# Patient Record
Sex: MALE | Race: OTHER | NOT HISPANIC OR LATINO | URBAN - METROPOLITAN AREA
[De-identification: names, ages, dates, MRNs, and addresses within clinical notes are randomized per-mention and may not be internally consistent; named-entity substitution may affect disease eponyms.]

---

## 2018-10-24 ENCOUNTER — EMERGENCY (EMERGENCY)
Facility: HOSPITAL | Age: 38
LOS: 1 days | Discharge: LEFT BEFORE DISCHARGE | End: 2018-10-24
Admitting: EMERGENCY MEDICINE

## 2018-10-24 VITALS
TEMPERATURE: 98 F | DIASTOLIC BLOOD PRESSURE: 80 MMHG | SYSTOLIC BLOOD PRESSURE: 127 MMHG | HEART RATE: 77 BPM | RESPIRATION RATE: 18 BRPM | OXYGEN SATURATION: 98 %

## 2018-10-24 NOTE — ED ADULT TRIAGE NOTE - CHIEF COMPLAINT QUOTE
Pt c/o numbness/tingling to left leg that began on Monday. Pt now states he is having left leg pain that is constant. Denies injury/trauma. Pt ambulatory in triage. Denies PMH.

## 2018-10-25 ENCOUNTER — INPATIENT (INPATIENT)
Facility: HOSPITAL | Age: 38
LOS: 0 days | Discharge: HOME | DRG: 69 | End: 2018-10-26
Attending: PSYCHIATRY & NEUROLOGY | Admitting: PSYCHIATRY & NEUROLOGY
Payer: COMMERCIAL

## 2018-10-25 VITALS
RESPIRATION RATE: 16 BRPM | HEART RATE: 57 BPM | WEIGHT: 154.98 LBS | TEMPERATURE: 97 F | SYSTOLIC BLOOD PRESSURE: 135 MMHG | HEIGHT: 70 IN | OXYGEN SATURATION: 99 % | DIASTOLIC BLOOD PRESSURE: 86 MMHG

## 2018-10-25 DIAGNOSIS — R53.1 WEAKNESS: ICD-10-CM

## 2018-10-25 LAB
ALBUMIN SERPL ELPH-MCNC: 4.6 G/DL — SIGNIFICANT CHANGE UP (ref 3.3–5)
ALP SERPL-CCNC: 49 U/L — SIGNIFICANT CHANGE UP (ref 40–120)
ALT FLD-CCNC: 14 U/L — SIGNIFICANT CHANGE UP (ref 10–45)
ANION GAP SERPL CALC-SCNC: 11 MMOL/L — SIGNIFICANT CHANGE UP (ref 5–17)
APPEARANCE UR: CLEAR — SIGNIFICANT CHANGE UP
APTT BLD: 32.6 SEC — SIGNIFICANT CHANGE UP (ref 27.5–37.4)
AST SERPL-CCNC: 22 U/L — SIGNIFICANT CHANGE UP (ref 10–40)
BACTERIA # UR AUTO: NEGATIVE — SIGNIFICANT CHANGE UP
BASOPHILS # BLD AUTO: 0.1 K/UL — SIGNIFICANT CHANGE UP (ref 0–0.2)
BASOPHILS NFR BLD AUTO: 1.3 % — SIGNIFICANT CHANGE UP (ref 0–2)
BILIRUB SERPL-MCNC: 0.4 MG/DL — SIGNIFICANT CHANGE UP (ref 0.2–1.2)
BILIRUB UR-MCNC: NEGATIVE — SIGNIFICANT CHANGE UP
BUN SERPL-MCNC: 18 MG/DL — SIGNIFICANT CHANGE UP (ref 7–23)
CALCIUM SERPL-MCNC: 9.5 MG/DL — SIGNIFICANT CHANGE UP (ref 8.4–10.5)
CHLORIDE SERPL-SCNC: 104 MMOL/L — SIGNIFICANT CHANGE UP (ref 96–108)
CO2 SERPL-SCNC: 26 MMOL/L — SIGNIFICANT CHANGE UP (ref 22–31)
COLOR SPEC: COLORLESS — SIGNIFICANT CHANGE UP
CREAT SERPL-MCNC: 1.02 MG/DL — SIGNIFICANT CHANGE UP (ref 0.5–1.3)
CRP SERPL-MCNC: <0.1 MG/DL — SIGNIFICANT CHANGE UP (ref 0–0.4)
DIFF PNL FLD: NEGATIVE — SIGNIFICANT CHANGE UP
EOSINOPHIL # BLD AUTO: 0.4 K/UL — SIGNIFICANT CHANGE UP (ref 0–0.5)
EOSINOPHIL NFR BLD AUTO: 6 % — SIGNIFICANT CHANGE UP (ref 0–6)
EPI CELLS # UR: 0 /HPF — SIGNIFICANT CHANGE UP
ERYTHROCYTE [SEDIMENTATION RATE] IN BLOOD: 3 MM/HR — SIGNIFICANT CHANGE UP (ref 0–15)
GAS PNL BLDV: SIGNIFICANT CHANGE UP
GLUCOSE SERPL-MCNC: 89 MG/DL — SIGNIFICANT CHANGE UP (ref 70–99)
GLUCOSE UR QL: NEGATIVE — SIGNIFICANT CHANGE UP
HCT VFR BLD CALC: 42.3 % — SIGNIFICANT CHANGE UP (ref 39–50)
HGB BLD-MCNC: 13.6 G/DL — SIGNIFICANT CHANGE UP (ref 13–17)
HYALINE CASTS # UR AUTO: 0 /LPF — SIGNIFICANT CHANGE UP (ref 0–2)
INR BLD: 1.25 RATIO — HIGH (ref 0.88–1.16)
KETONES UR-MCNC: NEGATIVE — SIGNIFICANT CHANGE UP
LEUKOCYTE ESTERASE UR-ACNC: NEGATIVE — SIGNIFICANT CHANGE UP
LYMPHOCYTES # BLD AUTO: 3.1 K/UL — SIGNIFICANT CHANGE UP (ref 1–3.3)
LYMPHOCYTES # BLD AUTO: 46.1 % — HIGH (ref 13–44)
MCHC RBC-ENTMCNC: 30.4 PG — SIGNIFICANT CHANGE UP (ref 27–34)
MCHC RBC-ENTMCNC: 32.2 GM/DL — SIGNIFICANT CHANGE UP (ref 32–36)
MCV RBC AUTO: 94.2 FL — SIGNIFICANT CHANGE UP (ref 80–100)
MONOCYTES # BLD AUTO: 0.6 K/UL — SIGNIFICANT CHANGE UP (ref 0–0.9)
MONOCYTES NFR BLD AUTO: 9.3 % — SIGNIFICANT CHANGE UP (ref 2–14)
NEUTROPHILS # BLD AUTO: 2.5 K/UL — SIGNIFICANT CHANGE UP (ref 1.8–7.4)
NEUTROPHILS NFR BLD AUTO: 37.3 % — LOW (ref 43–77)
NITRITE UR-MCNC: NEGATIVE — SIGNIFICANT CHANGE UP
PH UR: 7 — SIGNIFICANT CHANGE UP (ref 5–8)
PLATELET # BLD AUTO: 242 K/UL — SIGNIFICANT CHANGE UP (ref 150–400)
POTASSIUM SERPL-MCNC: 3.9 MMOL/L — SIGNIFICANT CHANGE UP (ref 3.5–5.3)
POTASSIUM SERPL-SCNC: 3.9 MMOL/L — SIGNIFICANT CHANGE UP (ref 3.5–5.3)
PROT SERPL-MCNC: 6.9 G/DL — SIGNIFICANT CHANGE UP (ref 6–8.3)
PROT UR-MCNC: NEGATIVE — SIGNIFICANT CHANGE UP
PROTHROM AB SERPL-ACNC: 13.7 SEC — HIGH (ref 9.8–12.7)
RBC # BLD: 4.49 M/UL — SIGNIFICANT CHANGE UP (ref 4.2–5.8)
RBC # FLD: 12.6 % — SIGNIFICANT CHANGE UP (ref 10.3–14.5)
RBC CASTS # UR COMP ASSIST: 0 /HPF — SIGNIFICANT CHANGE UP (ref 0–4)
SODIUM SERPL-SCNC: 141 MMOL/L — SIGNIFICANT CHANGE UP (ref 135–145)
SP GR SPEC: 1.04 — HIGH (ref 1.01–1.02)
UROBILINOGEN FLD QL: NEGATIVE — SIGNIFICANT CHANGE UP
WBC # BLD: 6.7 K/UL — SIGNIFICANT CHANGE UP (ref 3.8–10.5)
WBC # FLD AUTO: 6.7 K/UL — SIGNIFICANT CHANGE UP (ref 3.8–10.5)
WBC UR QL: 0 /HPF — SIGNIFICANT CHANGE UP (ref 0–5)

## 2018-10-25 PROCEDURE — 70496 CT ANGIOGRAPHY HEAD: CPT | Mod: 26

## 2018-10-25 PROCEDURE — 99255 IP/OBS CONSLTJ NEW/EST HI 80: CPT

## 2018-10-25 PROCEDURE — 93010 ELECTROCARDIOGRAM REPORT: CPT

## 2018-10-25 PROCEDURE — 70544 MR ANGIOGRAPHY HEAD W/O DYE: CPT | Mod: 26,59

## 2018-10-25 PROCEDURE — 70498 CT ANGIOGRAPHY NECK: CPT | Mod: 26

## 2018-10-25 PROCEDURE — 72125 CT NECK SPINE W/O DYE: CPT | Mod: 26

## 2018-10-25 PROCEDURE — 99285 EMERGENCY DEPT VISIT HI MDM: CPT | Mod: 25

## 2018-10-25 PROCEDURE — 70450 CT HEAD/BRAIN W/O DYE: CPT | Mod: 26,59

## 2018-10-25 PROCEDURE — 70547 MR ANGIOGRAPHY NECK W/O DYE: CPT | Mod: 26

## 2018-10-25 PROCEDURE — 93971 EXTREMITY STUDY: CPT | Mod: 26

## 2018-10-25 PROCEDURE — 70553 MRI BRAIN STEM W/O & W/DYE: CPT | Mod: 26

## 2018-10-25 PROCEDURE — 72156 MRI NECK SPINE W/O & W/DYE: CPT | Mod: 26

## 2018-10-25 PROCEDURE — 71045 X-RAY EXAM CHEST 1 VIEW: CPT | Mod: 26

## 2018-10-25 RX ORDER — GABAPENTIN 400 MG/1
300 CAPSULE ORAL THREE TIMES A DAY
Qty: 0 | Refills: 0 | Status: DISCONTINUED | OUTPATIENT
Start: 2018-10-25 | End: 2018-10-26

## 2018-10-25 RX ORDER — TRAMADOL HYDROCHLORIDE 50 MG/1
50 TABLET ORAL ONCE
Qty: 0 | Refills: 0 | Status: DISCONTINUED | OUTPATIENT
Start: 2018-10-25 | End: 2018-10-25

## 2018-10-25 RX ORDER — ASPIRIN/CALCIUM CARB/MAGNESIUM 324 MG
81 TABLET ORAL DAILY
Qty: 0 | Refills: 0 | Status: DISCONTINUED | OUTPATIENT
Start: 2018-10-25 | End: 2018-10-26

## 2018-10-25 RX ORDER — GABAPENTIN 400 MG/1
300 CAPSULE ORAL ONCE
Qty: 0 | Refills: 0 | Status: COMPLETED | OUTPATIENT
Start: 2018-10-25 | End: 2018-10-25

## 2018-10-25 RX ORDER — ENOXAPARIN SODIUM 100 MG/ML
40 INJECTION SUBCUTANEOUS EVERY 24 HOURS
Qty: 0 | Refills: 0 | Status: DISCONTINUED | OUTPATIENT
Start: 2018-10-25 | End: 2018-10-26

## 2018-10-25 RX ADMIN — GABAPENTIN 300 MILLIGRAM(S): 400 CAPSULE ORAL at 20:00

## 2018-10-25 RX ADMIN — ENOXAPARIN SODIUM 40 MILLIGRAM(S): 100 INJECTION SUBCUTANEOUS at 21:36

## 2018-10-25 RX ADMIN — TRAMADOL HYDROCHLORIDE 50 MILLIGRAM(S): 50 TABLET ORAL at 03:58

## 2018-10-25 RX ADMIN — GABAPENTIN 300 MILLIGRAM(S): 400 CAPSULE ORAL at 09:03

## 2018-10-25 RX ADMIN — Medication 81 MILLIGRAM(S): at 21:36

## 2018-10-25 RX ADMIN — GABAPENTIN 300 MILLIGRAM(S): 400 CAPSULE ORAL at 14:57

## 2018-10-25 NOTE — ED PROVIDER NOTE - PHYSICAL EXAMINATION
PHYSICAL EXAM:   General: well-appearing man  who appears stated age, in no acute distress  HEENT: normocephalic, atraumatic, PERRLA, no conjunctival erythema,   Cardiovascular: normal rate and rhythm, + S1/S2  Respiratory: clear to auscultation bilaterally, good aeration bilaterally  Abdominal: soft, nontender, nondistended, no rebound, guarding or rigidity, no peritoneal signs +bowel sounds  Extremities: no LE edema or color changes. Left calf ttp. DP pulses equal and strong b/l.   Neuro: Alert and oriented x3. CN2-12 intact, Strength 5/5 in upper and lower extremities. Sensation intact to light touch in upper and lower extremities. +left foot spasticity. Full ROM of left hip, no pain with flexion, internal or external rotation. Gait WNL. Left patellar reflex diminished, difficult to discern if this was 2/2 cramping/spasticity  Psychiatric: appropriate mood and affect.   Skin: appropriate color, warm, dry and intact  -Amada Wright PGY-1 PHYSICAL EXAM:   General: well-appearing man  who appears stated age, in no acute distress  HEENT: normocephalic, atraumatic, PERRLA, no conjunctival erythema,   Cardiovascular: normal rate and rhythm, + S1/S2  Respiratory: clear to auscultation bilaterally, good aeration bilaterally  Abdominal: soft, nontender, nondistended, no rebound, guarding or rigidity, no peritoneal signs +bowel sounds  Extremities: no LE edema or color changes. Left calf ttp. DP pulses equal and strong b/l.   Neuro: Alert and oriented x3. CN2-12 intact, Strength 5/5 in right upper and lower extremities, 4/5 in left upper and left lower extremities. Sensation intact to light touch in right upper and lower extremities. Decreased to light touch grossly in left upper and lower extremities. +left foot spasticity. Full ROM of left hip, no pain with flexion, internal or external rotation. Gait WNL. Left patellar hyperreflexia (3+)  Psychiatric: appropriate mood and affect.   Skin: appropriate color, warm, dry and intact  -Amada Wright PGY-1

## 2018-10-25 NOTE — ED PROVIDER NOTE - PROGRESS NOTE DETAILS
Amada Wright MD PGY-1 Neuro paged [id number]) to [phone] 2 times (1:45AM, 235 AM), and once to [id number] (251AM), awaiting callback. Amada Wright MD PGY-1 Spoke with neuro 4:16AM. Will consult in ED.

## 2018-10-25 NOTE — ED ADULT NURSE NOTE - OBJECTIVE STATEMENT
39 y/o male A&Ox4, PMH high cholesterol, left sided weakness "from a stroke when a child" as pt states, presents to ED with c/o left calf pain x2 days. Pt states pain causes numbness and spasms in left foot, toes and lower leg. Pt also has c/o left sided chest pain radiating to left back and down left arm. Pt denies SOB/n/v. Pt endorses family history of heart attack (paternal death 2014 from MI) Upon further assessment, airway patent and intact, ABD soft nontender. Skin intact. Peripheral pulses strong x4. Safety and comfort measures maintained.

## 2018-10-25 NOTE — ED ADULT NURSE NOTE - NSIMPLEMENTINTERV_GEN_ALL_ED
Implemented All Universal Safety Interventions:  [place] to call system. Call bell, personal items and telephone within reach. Instruct patient to call for assistance. Room bathroom lighting operational. Non-slip footwear when patient is off stretcher. Physically safe environment: no spills, clutter or unnecessary equipment. Stretcher in lowest position, wheels locked, appropriate side rails in place.

## 2018-10-25 NOTE — ED PROVIDER NOTE - ATTENDING CONTRIBUTION TO CARE
38 yom pmhx prior stroke per pt at the age of [age], presents w numbness and tingling to left arm primarily from elbow down to hand, as well as pain to left leg/ thigh with associated left leg numbness/ tingling to left lower leg as well x 2-3 days. states intermittently the pain in his left arm involves his shoulder also. pt denies any signif neck pain or manipulation of neck. denies any sxs involving his face.   pt states has chronic muscle contractions/ plantar flexion/ abnormal movement/ spasm to his lower left leg and foot since childhood presumably as sequellae of his prior stroke. states does not follow w a neurologist. pt states does not know why he had a stroke at age of [age]. denies headache, no fever or chills, is ambulating well, no trauma or falls, no vision changes.     ROS:   constitutional - no fever, no chills  eyes - no visual changes, no redness  eent - no sore throat, no nasal congestion  cvs - no chest pain, no leg swelling  resp - no shortness of breath, no cough  gi - no abdominal pain, no vomiting, no diarrhea  gu - no dysuria, no hematuria  msk - no acute back pain, no joint swelling  skin - no rashes, no jaundice  neuro - no headache, mild focal weakness  psych - no acute mental health issue     Physical Exam:   constitutional - well appearing, awake and alert, oriented x3  head - no external evidence of trauma  cvs - rrr, no murmurs, no peripheral edema  resp - breath sounds clear and equal bilat  gi - abdomen soft and nontender, no rigidity, guarding or rebound, bowel sounds present  msk - moving all extremities spontaneously  neuro - alert and oriented x3, CNs 2-12 grossly intact. no midline cervical or paraspinous tenderness; full rom to neck, no meningismus. left arm: strength 4.5/5, grossly sensation appears to be intact, no pronator drift to either arm. right arm: strength 5/5. left leg: pt states chronic mild to mod intermittent spasticity primarily involving lower leg/ foot. strength 4.5/5, gait nonantalgic. right le/5.     pt w new appearing very mild left sided weakness and reported sensory deficit. pt states has crhonic spasticity of left foot though states perhaps mildly worse at this time.   ? recurrent small stroke vs vascular dissection/ stenosis vs transverse myelitis vs new MS - neuro consulted in ED and recommends admission for further eval of CT/ CTA findings above. DONA Jimenez MD   skin- no jaundice, warm and dry  psych - mood and affect wnl, no apparent risk to self or others

## 2018-10-25 NOTE — CONSULT NOTE ADULT - SUBJECTIVE AND OBJECTIVE BOX
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 Patient reports that he woke up with changes to sensation on Monday morning. Patient states that his left leg below the knee is experiencing burning paresthesia, above the knee numbness; his left arm is experiencing numbness with sharp pain in his antecubital fossa. Patient states that the sensation changes have been persistent, not improving with medication or movement. Patient denies any other neurological changes. Patient denies fevers, chills, ns, cp, sob, abd pain, n/v/d/c, weakness, or changes to his weight or senses.  He denies CP, SOB LOC.  CTA reveals marked narrowing of the ICA.  Neuro w/u in progress,       PAST MEDICAL & SURGICAL HISTORY:  Hyperlipidemia: diet controlled  No significant past surgical history          MEDICATIONS:  MEDICATIONS  (STANDING):  aspirin enteric coated 81 milliGRAM(s) Oral daily  enoxaparin Injectable 40 milliGRAM(s) SubCutaneous every 24 hours  gabapentin 300 milliGRAM(s) Oral three times a day      Allergies    No Known Allergies    Intolerances        FAMILY HISTORY:  Family history of coronary artery disease in father:  MI 59yo    Non-contributary for premature coronary disease or sudden cardiac death    SOCIAL HISTORY:    [ X] Non-smoker  [ ] Smoker  [ ] Alcohol      REVIEW OF SYSTEMS:  [ ]chest pain  [  ]shortness of breath  [  ]palpitations  [  ]syncope  [ ]near syncope [ ]upper extremity weakness   [ ] lower extremity weakness  [  ]diplopia  [  ]altered mental status   [  ]fevers  [ ]chills [ ]nausea  [ ]vomitting  [  ]dysphagia    [ ]abdominal pain  [ ]melena  [ ]BRBPR    [  ]epistaxis  [  ]rash    [ ]lower extremity edema        [X ] All others negative	  [ ] Unable to obtain      LABS:	 	    CARDIAC MARKERS:                              13.6   6.7   )-----------( 242      ( 25 Oct 2018 01:55 )             42.3     Hb Trend: 13.6<--    10-25    141  |  104  |  18  ----------------------------<  89  3.9   |  26  |  1.02    Ca    9.5      25 Oct 2018 01:55    TPro  6.9  /  Alb  4.6  /  TBili  0.4  /  DBili  x   /  AST  22  /  ALT  14  /  AlkPhos  49  10-25    Creatinine Trend: 1.02<--    Coags:  PT/INR - ( 25 Oct 2018 01:57 )   PT: 13.7 sec;   INR: 1.25 ratio         PTT - ( 25 Oct 2018 01:57 )  PTT:32.6 sec        PHYSICAL EXAM:  T(C): 36.4 (10-25-18 @ 16:01), Max: 36.7 (10-25-18 @ 01:05)  HR: 59 (10-25-18 @ 16:01) (57 - 78)  BP: 114/72 (10-25-18 @ 16:01) (113/73 - 135/86)  RR: 18 (10-25-18 @ 16:01) (12 - 18)  SpO2: 99% (10-25-18 @ 16:01) (97% - 100%)  Wt(kg): --  I&O's Summary      Gen: Appears well in NAD  HEENT:  (-)icterus (-)pallor  CV: N S1 S2 1/6 DAVID (+)2 Pulses B/l  Resp:  Clear to ausculatation B/L, normal effort  GI: (+) BS Soft, NT, ND  Lymph:  (-)Edema, (-)obvious lymphadenopathy  Skin: Warm to touch, Normal turgor  Psych: Appropriate mood and affect          ECG:  	Sinus     RADIOLOGY:         CXR:  No infiltrate     ASSESSMENT/PLAN: 	38y Male admitted with amor sensory loss found with narrowing of the ICA ? Black Black    - Plan for Cerebral angiogram  - No embolic CVA on MRI  - Neuro f/u  - Will follow with you    I once again thank you for allowing me to participate in the care of your patient.  If you have any questions or concerns please do not hesitate to contact me.    Edwin Andre MD, FACC

## 2018-10-25 NOTE — ED PROVIDER NOTE - NS ED ROS FT
REVIEW OF SYSTEMS:  General: no fever, no chills  HEENT: no headache, no vision changes,   Cardiac: no chest pain,  Respiratory:  no shortness of breath  Gastrointestinal: no abdominal pain, no nausea, no vomiting, no diarrhea, no melena, no hematochezia   Musculoskeletal: +left arm numbness/tingling intermittently with associated pain in antecubital fossa, +left leg "burning" of calf, numbness/tingling from hip to toes  Endo: no known diabetes, no known thyroid disease  Skin: no rashsanjeev   -Amada Wright PGY-1

## 2018-10-25 NOTE — H&P ADULT - ASSESSMENT
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 Patient reports that he woke up with changes to sensation on Monday morning. Patient states that his left leg below the knee is experiencing burning paresthesia, above the knee numbness; his left arm is experiencing numbness with sharp pain in his antecubital fossa. Patient states that the sensation changes have been persistent, not improving with medication or movement. Patient denies any other neurological changes. Patient denies fevers, chills, ns, cp, sob, abd pain, n/v/d/c, weakness, or changes to his weight or senses.    Black Black vs. Vasculitis vs. Other central process.     Plan:   1. Sed Rate, ESR  2. MRI Brain w/wo, MRI C/T/L Spine w/wo.

## 2018-10-25 NOTE — ED PROVIDER NOTE - MEDICAL DECISION MAKING DETAILS
Amada Wright MD PGY-1 39 y/o male with hx "stroke" as child,p/w 4 daysleft leg numbness/tingling (hip to toes) and burning (knees to toes) with intermittent left arm numbness and tingling (shoulders to fingertips) and pain in antecubital fossa. Concern for UMN pathology (MS, ALS, transverse myelitis, carotid artery dissection/pathology). CThead and neck, CTA head and neck, neuro consult, UA, labs, pain control

## 2018-10-25 NOTE — H&P ADULT - ATTENDING COMMENTS
38-year-old right-handed [place] gentleman first evaluated at [facility] on 10/25/18 with left-sided numbness. At the age of 2, he developed apparently sudden left hemiparesis which has persisted. He was told that he might have had "a stroke or polio". At baseline, he uses a cane. On 10/22/18, he developed numbness and burning affecting his left arm and leg. ROS otherwise negative. Exam. Alert and attentive; mild atrophy of his left leg more so than left arm; mildly increased tone on the left; left side: deltoid 4 -/5; wrist extensors 3+/5; iliopsoas 4 -/5; anterior tibialis 5/5; remainder of neurologic exam was nonfocal. CT head (10/24/15) to my eye was unremarkable. CTA neck and head (10/24/18) to my eye showed at least moderate stenosis of the terminal right ICA, perhaps mild stenosis of the proximal right M1, with some abnormal contrast enhancement around the M1 segment perhaps representing dilated perforating artery is but this is uncertain, possibly consistent with moyamoya. Impression. At the age of 2, he developed left-sided weakness which persisted, of uncertain etiology. On 10/22/18, he developed numbness affecting his left arm and leg. His presentation is consistent with right brain dysfunction, etiology uncertain. CTA shows stenosis of the terminal right ICA and possibly proximal right MCA, with the suggestion of hypertrophied lenticulostriates, possibly consistent with a moyamoya-like pattern. It is possible, therefore, that he has moyamoya disease, possibly causing right hemispheric ischemia. This diagnosis is uncertain, and how this might relate to his left hemiparesis occurring at the age of 2 remains unclear. Plan. MRI brain/MRA neck and head/MRA NOVA; Diamox SPECT; cerebral angiogram; further management to be guided by results of his investigations; aspirin; PT/OT.

## 2018-10-25 NOTE — H&P ADULT - NSHPPHYSICALEXAM_GEN_ALL_CORE
Constitutional: awake and alert.  HEENT: PERRLA, EOMI,     Neurological exam:  HF: A x O x 3. Appropriately interactive, normal affect. Speech fluent, No Aphasia or paraphasic errors. Naming /repetition intact   CN: TANK, EOMI, VFF, facial sensation normal, no NLFD, tongue midline, Palate moves equally, SCM equal bilaterally  Motor: Strength 4/5 in LUE, 4-/5 LLE, 5/5 RUE/RLE. Normal bulk and tone, no tremor, rigidity or bradykinesia.    Sens: Diminished sensation to pinpoint over LUE/LLE. Intact soft touch, temperature throughout.   Reflexes: 2+ in right Biceps, Triceps, Brachioradialis, Patella, Achilles. 3+ in left Biceps, Triceps, Brachioradialis, Patella, Achilles. downgoing toes b/l  Coord:  No FNFA, dysmetria, ZANE intact   Gait/Balance: Ataxic gait likely d/t LLE weakness.

## 2018-10-25 NOTE — H&P ADULT - HISTORY OF PRESENT ILLNESS
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 Patient reports that he woke up with changes to sensation on Monday morning. Patient states that his left leg below the knee is experiencing burning paresthesia, above the knee numbness; his left arm is experiencing numbness with sharp pain in his antecubital fossa. Patient states that the sensation changes have been persistent, not improving with medication or movement. Patient denies any other neurological changes. Patient denies fevers, chills, ns, cp, sob, abd pain, n/v/d/c, weakness, or changes to his weight or senses.

## 2018-10-25 NOTE — ED ADULT NURSE NOTE - CHPI ED NUR SYMPTOMS NEG
no syncope/no chills/no shortness of breath/no congestion/no diaphoresis/no dizziness/no fever/no vomiting/no nausea

## 2018-10-25 NOTE — ED ADULT NURSE REASSESSMENT NOTE - NS ED NURSE REASSESS COMMENT FT1
Received report from Morelia Ross in red. patient found fully dressed with gown over clothing coming in for left sided weakness/pain. Hx of stroke at age 2. Patient denies blood thinning medications, this stroke left him with left sided weakness which is his baseline.   States he came in for pain in his left lower extremity, states the pain is new but the weakness is his baseline. sensation and circulation intact b/l . neuro check completed which shows left sided weakness. patient is Alert and oriented x3, states he is in 6/10 pain for his leg and that it has improved after medication.   Patient made aware that he is pending room assignment, lights dimmed for comfort with call bell in hand. Received report from Morelia Ross in red. patient found fully dressed with gown over clothing coming in for left sided weakness/pain. Hx of stroke at age 2. Patient denies blood thinning medications, this stroke left him with left sided weakness which is his baseline.   States he came in for pain in his left lower extremity, states the pain is new but the weakness is his baseline, sensation decreased on left side, circulation intact b/l. neuro check completed which shows left sided weakness. patient is Alert and oriented x3, states he is in 6/10 pain for his leg and that it has improved after medication.   Patient made aware that he is pending room assignment, lights dimmed for comfort with call bell in hand.

## 2018-10-25 NOTE — H&P ADULT - NSHPLABSRESULTS_GEN_ALL_CORE
LABS:                        13.6   6.7   )-----------( 242      ( 25 Oct 2018 01:55 )             42.3     25 Oct 2018 01:55    141    |  104    |  18     ----------------------------<  89     3.9     |  26     |  1.02     Ca    9.5        25 Oct 2018 01:55    TPro  6.9    /  Alb  4.6    /  TBili  0.4    /  DBili  x      /  AST  22     /  ALT  14     /  AlkPhos  49     25 Oct 2018 01:55    PT/INR - ( 25 Oct 2018 01:57 )   PT: 13.7 sec;   INR: 1.25 ratio         PTT - ( 25 Oct 2018 01:57 )  PTT:32.6 sec    < from: CT Head No Cont (10.25.18 @ 02:33) >    IMPRESSION:       No acute intracranial hemorrhage, mass effect, abnormal enhancement or   acute territorial infarct. Nonspecific asymmetric region of   low-attenuation right frontal centrum semiovale which could be sequela of   prior lacunar infarct given clinical history.    Marked narrowing of the right ICA terminus with hypoplastic A1 segment   anterior cerebral artery and overall decreased caliber right middle   cerebral artery. Asymmetric cluster of small vessels around the right   middle cerebral artery suggests that this may be due to underlying   unilateral moyamoya syndrome. Vasculitis is in the differential. No focal   occlusion or aneurysm of the intracranial circulation.    No CT evidence for acute cervical spine fracture or traumatic   malalignment.  Patent cervical carotid and vertebral systems within the   neck.  No hemodynamically significant stenosis using NASCET criteria.  No   evidence of dissection.    ROYCE CHUA M.D., RADIOLOGY RESIDENT  This document has been electronically signed.  REX MARLEY M.D., RADIOLOGIST  This document has been electronically signed. Oct 25 2018  3:47AM

## 2018-10-25 NOTE — ED PROVIDER NOTE - OBJECTIVE STATEMENT
Amada Wright MD PGY-1 Pt is a 39 y/o man with no PMH who p/w left leg pain and numbness/tingling since Monday (10/22) and intermittent left arm pain, numbness/tingling x 3 weeks. Pain described as "burning", from knee to toes, with numbness/tingling from hip to toes. Describes intermittent left arm numbness/tingling from shoulder to finger tips with pain in antecubital fossa (not currently). Describes hx of "stroke" as a child (per mom, at age 2 he walked over to her and collapsed, and was told he had a stroke possibly). Denies HA, vision changes, never had symptoms like this before, no recent illness. Notes intermittent contractions/plantarflexion of left foot (which is chronic per pt). Has been taking tylenol w/o relief Amada Wright MD PGY-1 Pt is a 39 y/o man with no PMH who p/w left leg pain and numbness/tingling since Monday (10/22) and intermittent left arm pain, numbness/tingling x 3 weeks. Pain described as "burning", from knee to toes, with numbness/tingling from hip to toes. Describes intermittent left arm numbness/tingling from shoulder to finger tips with pain in antecubital fossa (not currently). Describes hx of "stroke" as a child (per mom, at age 2 he walked over to her and collapsed, and was told he had a stroke possibly). Denies HA, vision changes, never had symptoms like this before, no recent illness. Notes intermittent contractions/plantarflexion of left foot (which is chronic per pt). Has been taking tylenol w/o relief. No bowel/bladder incontinence or sensory changes

## 2018-10-26 ENCOUNTER — TRANSCRIPTION ENCOUNTER (OUTPATIENT)
Age: 38
End: 2018-10-26

## 2018-10-26 ENCOUNTER — APPOINTMENT (OUTPATIENT)
Dept: NEUROLOGY | Facility: CLINIC | Age: 38
End: 2018-10-26
Payer: COMMERCIAL

## 2018-10-26 VITALS
HEART RATE: 64 BPM | DIASTOLIC BLOOD PRESSURE: 71 MMHG | SYSTOLIC BLOOD PRESSURE: 109 MMHG | OXYGEN SATURATION: 98 % | RESPIRATION RATE: 17 BRPM | TEMPERATURE: 97 F

## 2018-10-26 LAB
ANION GAP SERPL CALC-SCNC: 10 MMOL/L — SIGNIFICANT CHANGE UP (ref 5–17)
ANION GAP SERPL CALC-SCNC: 12 MMOL/L — SIGNIFICANT CHANGE UP (ref 5–17)
BASOPHILS # BLD AUTO: 0 K/UL — SIGNIFICANT CHANGE UP (ref 0–0.2)
BASOPHILS NFR BLD AUTO: 0.4 % — SIGNIFICANT CHANGE UP (ref 0–2)
BLD GP AB SCN SERPL QL: NEGATIVE — SIGNIFICANT CHANGE UP
BUN SERPL-MCNC: 12 MG/DL — SIGNIFICANT CHANGE UP (ref 7–23)
BUN SERPL-MCNC: 16 MG/DL — SIGNIFICANT CHANGE UP (ref 7–23)
CALCIUM SERPL-MCNC: 9 MG/DL — SIGNIFICANT CHANGE UP (ref 8.4–10.5)
CALCIUM SERPL-MCNC: 9.1 MG/DL — SIGNIFICANT CHANGE UP (ref 8.4–10.5)
CHLORIDE SERPL-SCNC: 105 MMOL/L — SIGNIFICANT CHANGE UP (ref 96–108)
CHLORIDE SERPL-SCNC: 105 MMOL/L — SIGNIFICANT CHANGE UP (ref 96–108)
CHOLEST SERPL-MCNC: 216 MG/DL — HIGH (ref 10–199)
CO2 SERPL-SCNC: 24 MMOL/L — SIGNIFICANT CHANGE UP (ref 22–31)
CO2 SERPL-SCNC: 25 MMOL/L — SIGNIFICANT CHANGE UP (ref 22–31)
CREAT SERPL-MCNC: 0.91 MG/DL — SIGNIFICANT CHANGE UP (ref 0.5–1.3)
CREAT SERPL-MCNC: 1.04 MG/DL — SIGNIFICANT CHANGE UP (ref 0.5–1.3)
EOSINOPHIL # BLD AUTO: 0.3 K/UL — SIGNIFICANT CHANGE UP (ref 0–0.5)
EOSINOPHIL NFR BLD AUTO: 3.6 % — SIGNIFICANT CHANGE UP (ref 0–6)
GLUCOSE SERPL-MCNC: 93 MG/DL — SIGNIFICANT CHANGE UP (ref 70–99)
GLUCOSE SERPL-MCNC: 99 MG/DL — SIGNIFICANT CHANGE UP (ref 70–99)
HBA1C BLD-MCNC: 5.5 % — SIGNIFICANT CHANGE UP (ref 4–5.6)
HCT VFR BLD CALC: 42 % — SIGNIFICANT CHANGE UP (ref 39–50)
HCT VFR BLD CALC: 43.6 % — SIGNIFICANT CHANGE UP (ref 39–50)
HDLC SERPL-MCNC: 45 MG/DL — SIGNIFICANT CHANGE UP
HGB BLD-MCNC: 14 G/DL — SIGNIFICANT CHANGE UP (ref 13–17)
HGB BLD-MCNC: 14.4 G/DL — SIGNIFICANT CHANGE UP (ref 13–17)
LIPID PNL WITH DIRECT LDL SERPL: 151 MG/DL — HIGH
LYMPHOCYTES # BLD AUTO: 2.1 K/UL — SIGNIFICANT CHANGE UP (ref 1–3.3)
LYMPHOCYTES # BLD AUTO: 26.1 % — SIGNIFICANT CHANGE UP (ref 13–44)
MCHC RBC-ENTMCNC: 30.2 PG — SIGNIFICANT CHANGE UP (ref 27–34)
MCHC RBC-ENTMCNC: 31.1 PG — SIGNIFICANT CHANGE UP (ref 27–34)
MCHC RBC-ENTMCNC: 33 GM/DL — SIGNIFICANT CHANGE UP (ref 32–36)
MCHC RBC-ENTMCNC: 33.3 GM/DL — SIGNIFICANT CHANGE UP (ref 32–36)
MCV RBC AUTO: 90.5 FL — SIGNIFICANT CHANGE UP (ref 80–100)
MCV RBC AUTO: 94.2 FL — SIGNIFICANT CHANGE UP (ref 80–100)
MONOCYTES # BLD AUTO: 0.6 K/UL — SIGNIFICANT CHANGE UP (ref 0–0.9)
MONOCYTES NFR BLD AUTO: 6.7 % — SIGNIFICANT CHANGE UP (ref 2–14)
NEUTROPHILS # BLD AUTO: 5.2 K/UL — SIGNIFICANT CHANGE UP (ref 1.8–7.4)
NEUTROPHILS NFR BLD AUTO: 63.2 % — SIGNIFICANT CHANGE UP (ref 43–77)
PLATELET # BLD AUTO: 246 K/UL — SIGNIFICANT CHANGE UP (ref 150–400)
PLATELET # BLD AUTO: 247 K/UL — SIGNIFICANT CHANGE UP (ref 150–400)
POTASSIUM SERPL-MCNC: 4.1 MMOL/L — SIGNIFICANT CHANGE UP (ref 3.5–5.3)
POTASSIUM SERPL-MCNC: 4.4 MMOL/L — SIGNIFICANT CHANGE UP (ref 3.5–5.3)
POTASSIUM SERPL-SCNC: 4.1 MMOL/L — SIGNIFICANT CHANGE UP (ref 3.5–5.3)
POTASSIUM SERPL-SCNC: 4.4 MMOL/L — SIGNIFICANT CHANGE UP (ref 3.5–5.3)
RBC # BLD: 4.62 M/UL — SIGNIFICANT CHANGE UP (ref 4.2–5.8)
RBC # BLD: 4.64 M/UL — SIGNIFICANT CHANGE UP (ref 4.2–5.8)
RBC # FLD: 12.7 % — SIGNIFICANT CHANGE UP (ref 10.3–14.5)
RBC # FLD: 13.4 % — SIGNIFICANT CHANGE UP (ref 10.3–14.5)
RH IG SCN BLD-IMP: POSITIVE — SIGNIFICANT CHANGE UP
SODIUM SERPL-SCNC: 140 MMOL/L — SIGNIFICANT CHANGE UP (ref 135–145)
SODIUM SERPL-SCNC: 141 MMOL/L — SIGNIFICANT CHANGE UP (ref 135–145)
TOTAL CHOLESTEROL/HDL RATIO MEASUREMENT: 4.8 RATIO — SIGNIFICANT CHANGE UP (ref 3.4–9.6)
TRIGL SERPL-MCNC: 99 MG/DL — SIGNIFICANT CHANGE UP (ref 10–149)
WBC # BLD: 6.53 K/UL — SIGNIFICANT CHANGE UP (ref 3.8–10.5)
WBC # BLD: 8.2 K/UL — SIGNIFICANT CHANGE UP (ref 3.8–10.5)
WBC # FLD AUTO: 6.53 K/UL — SIGNIFICANT CHANGE UP (ref 3.8–10.5)
WBC # FLD AUTO: 8.2 K/UL — SIGNIFICANT CHANGE UP (ref 3.8–10.5)

## 2018-10-26 PROCEDURE — 70544 MR ANGIOGRAPHY HEAD W/O DYE: CPT

## 2018-10-26 PROCEDURE — 70498 CT ANGIOGRAPHY NECK: CPT

## 2018-10-26 PROCEDURE — 85610 PROTHROMBIN TIME: CPT

## 2018-10-26 PROCEDURE — A9585: CPT

## 2018-10-26 PROCEDURE — 71045 X-RAY EXAM CHEST 1 VIEW: CPT

## 2018-10-26 PROCEDURE — 80053 COMPREHEN METABOLIC PANEL: CPT

## 2018-10-26 PROCEDURE — 86900 BLOOD TYPING SEROLOGIC ABO: CPT

## 2018-10-26 PROCEDURE — 86850 RBC ANTIBODY SCREEN: CPT

## 2018-10-26 PROCEDURE — 36223 PLACE CATH CAROTID/INOM ART: CPT | Mod: 59

## 2018-10-26 PROCEDURE — 72125 CT NECK SPINE W/O DYE: CPT

## 2018-10-26 PROCEDURE — 86901 BLOOD TYPING SEROLOGIC RH(D): CPT

## 2018-10-26 PROCEDURE — 70547 MR ANGIOGRAPHY NECK W/O DYE: CPT

## 2018-10-26 PROCEDURE — 36226 PLACE CATH VERTEBRAL ART: CPT

## 2018-10-26 PROCEDURE — 70553 MRI BRAIN STEM W/O & W/DYE: CPT

## 2018-10-26 PROCEDURE — 93971 EXTREMITY STUDY: CPT

## 2018-10-26 PROCEDURE — 36223 PLACE CATH CAROTID/INOM ART: CPT | Mod: 59,LT

## 2018-10-26 PROCEDURE — 82330 ASSAY OF CALCIUM: CPT

## 2018-10-26 PROCEDURE — 93005 ELECTROCARDIOGRAM TRACING: CPT

## 2018-10-26 PROCEDURE — 84132 ASSAY OF SERUM POTASSIUM: CPT

## 2018-10-26 PROCEDURE — 85730 THROMBOPLASTIN TIME PARTIAL: CPT

## 2018-10-26 PROCEDURE — 80048 BASIC METABOLIC PNL TOTAL CA: CPT

## 2018-10-26 PROCEDURE — C1894: CPT

## 2018-10-26 PROCEDURE — 86140 C-REACTIVE PROTEIN: CPT

## 2018-10-26 PROCEDURE — 36224 PLACE CATH CAROTD ART: CPT

## 2018-10-26 PROCEDURE — 81001 URINALYSIS AUTO W/SCOPE: CPT

## 2018-10-26 PROCEDURE — 72156 MRI NECK SPINE W/O & W/DYE: CPT

## 2018-10-26 PROCEDURE — 82947 ASSAY GLUCOSE BLOOD QUANT: CPT

## 2018-10-26 PROCEDURE — 85014 HEMATOCRIT: CPT

## 2018-10-26 PROCEDURE — 70496 CT ANGIOGRAPHY HEAD: CPT

## 2018-10-26 PROCEDURE — 83605 ASSAY OF LACTIC ACID: CPT

## 2018-10-26 PROCEDURE — 70450 CT HEAD/BRAIN W/O DYE: CPT

## 2018-10-26 PROCEDURE — 82435 ASSAY OF BLOOD CHLORIDE: CPT

## 2018-10-26 PROCEDURE — 99285 EMERGENCY DEPT VISIT HI MDM: CPT

## 2018-10-26 PROCEDURE — C1887: CPT

## 2018-10-26 PROCEDURE — 76377 3D RENDER W/INTRP POSTPROCES: CPT

## 2018-10-26 PROCEDURE — 84295 ASSAY OF SERUM SODIUM: CPT

## 2018-10-26 PROCEDURE — 85027 COMPLETE CBC AUTOMATED: CPT

## 2018-10-26 PROCEDURE — 36224 PLACE CATH CAROTD ART: CPT | Mod: RT

## 2018-10-26 PROCEDURE — 36226 PLACE CATH VERTEBRAL ART: CPT | Mod: 50

## 2018-10-26 PROCEDURE — 99233 SBSQ HOSP IP/OBS HIGH 50: CPT | Mod: 25

## 2018-10-26 PROCEDURE — 85652 RBC SED RATE AUTOMATED: CPT

## 2018-10-26 PROCEDURE — 80061 LIPID PANEL: CPT

## 2018-10-26 PROCEDURE — 82803 BLOOD GASES ANY COMBINATION: CPT

## 2018-10-26 PROCEDURE — C1769: CPT

## 2018-10-26 PROCEDURE — 76377 3D RENDER W/INTRP POSTPROCES: CPT | Mod: 26

## 2018-10-26 PROCEDURE — 83036 HEMOGLOBIN GLYCOSYLATED A1C: CPT

## 2018-10-26 RX ORDER — SODIUM CHLORIDE 9 MG/ML
1000 INJECTION INTRAMUSCULAR; INTRAVENOUS; SUBCUTANEOUS
Qty: 0 | Refills: 0 | Status: DISCONTINUED | OUTPATIENT
Start: 2018-10-26 | End: 2018-10-26

## 2018-10-26 RX ORDER — GABAPENTIN 400 MG/1
1 CAPSULE ORAL
Qty: 90 | Refills: 0 | OUTPATIENT
Start: 2018-10-26 | End: 2018-11-24

## 2018-10-26 RX ORDER — GABAPENTIN 400 MG/1
1 CAPSULE ORAL
Qty: 90 | Refills: 0
Start: 2018-10-26 | End: 2018-11-24

## 2018-10-26 RX ORDER — ASPIRIN/CALCIUM CARB/MAGNESIUM 324 MG
1 TABLET ORAL
Qty: 30 | Refills: 0
Start: 2018-10-26 | End: 2018-11-24

## 2018-10-26 RX ADMIN — Medication 81 MILLIGRAM(S): at 16:43

## 2018-10-26 RX ADMIN — GABAPENTIN 300 MILLIGRAM(S): 400 CAPSULE ORAL at 16:42

## 2018-10-26 RX ADMIN — GABAPENTIN 300 MILLIGRAM(S): 400 CAPSULE ORAL at 05:29

## 2018-10-26 NOTE — DISCHARGE NOTE ADULT - CARE PLAN
Principal Discharge DX:	TIA (transient ischemic attack)  Goal:	- prevention and outpatient follow up  Assessment and plan of treatment:	- outpatient follow up with stroke NP  - continue to take ASA  Secondary Diagnosis:	Chronic ischemic right MCA stroke

## 2018-10-26 NOTE — CHART NOTE - NSCHARTNOTEFT_GEN_A_CORE
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 Patient reports that he woke up with changes to sensation on Monday morning. Patient states that his left leg below the knee is experiencing burning paresthesia, above the knee numbness; his left arm is experiencing numbness with sharp pain in his antecubital fossa. Patient states that the sensation changes have been persistent, not improving with medication or movement. Patient denies any other neurological changes. Patient denies fevers, chills, ns, cp, sob, abd pain, n/v/d/c, weakness, or changes to his weight or senses. (25 Oct 2018 06:08)      Allergies: No Known Allergies  PMHX: hyperlipidemia  PSHX: no significant past surgical history  Social History:  on-smoker   FAMILY HISTORY: non-contributory   Current Medications:   aspirin enteric coated 81 milliGRAM(s) Oral daily  enoxaparin Injectable 40 milliGRAM(s) SubCutaneous every 24 hours  gabapentin 300 milliGRAM(s) Oral three times a day      CBC                        13.6   6.7   )-----------( 242                  42.3       BMP    141  |  105  |  16  ----------------------------<  99  4.4   |  24  |  1.04      Blood Bank: O positive available       Assessment/Plan:   This is a 38 year old right hand dominant male with   Procedure, goals, risks, benefits and alternatives were discussed with patient and patient's family.  All questions were answered.  Risks include but are not limited to stroke, vessel injury, hemorrhage, and or right  groin hematoma.  Patient demonstrates understanding  of all risks involved with this procedure and wishes to continue.   Appropriate  content was obtained from patient and consent is in the patient's chart. 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 Patient reports that he woke up with changes to sensation on Monday morning. Patient states that his left leg below the knee is experiencing burning paresthesia, above the knee numbness; his left arm is experiencing numbness with sharp pain in his antecubital fossa. Patient states that the sensation changes have been persistent, not improving with medication or movement. Patient denies any other neurological changes. Patient is transported to Neuro IR for a selective cerebral angiogram to study vessels.    Allergies: No Known Allergies  PMHX: hyperlipidemia  PSHX: no significant past surgical history  Social History: non-smoker   FAMILY HISTORY: non-contributory   Current Medications:   aspirin enteric coated 81 milliGRAM(s) Oral daily  enoxaparin Injectable 40 milliGRAM(s) SubCutaneous every 24 hours  gabapentin 300 milliGRAM(s) Oral three times a day    CBC                        13.6   6.7   )-----------( 242                  42.3       BMP    141  |  105  |  16  ----------------------------<  99  4.4   |  24  |  1.04    Blood Bank: O positive available     Assessment/Plan:   This is a 38 year old right hand dominant male with narrowing of right ICA terminus, concerning for Field field. Patient is transported to Neuro IR for a selective cerebral angiogram to study vessels. Procedure, goals, risks, benefits and alternatives were discussed with patient. All questions were answered. Risks include but are not limited to stroke, vessel injury, hemorrhage, and or right groin hematoma. Patient demonstrates understanding of all risks involved with this procedure and wishes to continue. Appropriate content was obtained from patient and consent is in the patient's chart.

## 2018-10-26 NOTE — CHART NOTE - NSCHARTNOTEFT_GEN_A_CORE
Interventional Neuro- Radiology   Procedure Note PA-C    Procedure: Selective Cerebral Angiography   Pre- Procedure Diagnosis: Field field  Post- Procedure Diagnosis:    : Dr Rob Medina  Resident: Dr Mac Todd    Physician Assistant: Henea Bravo PA-C  Nurse:                        Briseida Gagnon RN  Radiologic Tech:        Christopher D'Amico LRT      Anesthesiologist:        Dr Gorge Wang   Sheath:                       4 [place] short sheath    I/Os: EBL less than 10cc  IV fluids:100cc  Urine output due to void Contrast Omnipaque 240      cc           Vitals: /67       HR 85    Spo2 100%    Preliminary Report:  Using a 4 [place] short sheath to the right groin under MAC sedation via left vertebral artery, left common carotid artery, right vertebral artery, right internal carotid artery a selective cerebral angiography was performed and demonstrated   Patient tolerated procedure well, hemodynamically stable, no change in neurological status compared to baseline.  Results discussed with neurosurgery, patient and patient's  family.  Groin sheath was removed,  manual compression held to hemostasis for  21 minutes, no active bleeding, no hematoma, quick clot and safeguard balloon dressing applied at STAT labs: CBC BMP at 1400 hours Interventional Neuro- Radiology   Procedure Note PA-C    Procedure: Selective Cerebral Angiography   Pre- Procedure Diagnosis: Field field  Post- Procedure Diagnosis: no Field field     : Dr Rob Medina  Resident: Dr Mac Todd    Physician Assistant: Heena Bravo PA-C  Nurse:                        Briseida Gagnon RN  Radiologic Tech:        Christopher D'Amico LRT      Anesthesiologist:        Dr Gorge Wang   Sheath:                       4 Polish short sheath    I/Os: EBL less than 10cc  IV fluids:100cc Urine output due to void  Contrast Omnipaque 240 156 cc           Vitals: /67    HR 85    Spo2 100%    Preliminary Report:  Using a 4 Polish short sheath to the right groin under MAC sedation via left vertebral artery, left common carotid artery, right vertebral artery, right internal carotid artery, right internal artery a selective cerebral angiography was performed and demonstrated no evidence of field field.   Patient tolerated procedure well, hemodynamically stable, no change in neurological status compared to baseline. Results discussed with patient.  Right groin sheath was removed, manual compression held to hemostasis for 21 minutes, no active bleeding, no hematoma, quick clot and safeguard balloon dressing applied at STAT labs: CBC BMP at 1400 hours. Interventional Neuro- Radiology   Procedure Note PA-C    Procedure: Selective Cerebral Angiography   Pre- Procedure Diagnosis: moyamoya  Post- Procedure Diagnosis: no moyamoya     : Dr Rob Medina  Resident: Dr Mac Todd    Physician Assistant: Heena Bravo PA-C  Nurse:                        Briseida Gagnon RN  Radiologic Tech:        Christopher D'Amico LRT      Anesthesiologist:        Dr Gorge Wang   Sheath:                       4 Spanish short sheath    I/Os EBL less than 10ccIV fluids:100cc Urine output due to void Contrast Omnipaque 240 156 cc           Vitals: /67    HR 85    Spo2 100%    Preliminary Report:  Using a 4 Spanish short sheath to the right groin under MAC sedation via left vertebral artery, left common carotid artery, right vertebral artery, right internal carotid artery, right internal artery a selective cerebral angiography was performed and demonstrated no evidence of moyamoya or significant stenosis. Significant tortuosity of the right M2 terrio     Patient tolerated procedure well, hemodynamically stable, no change in neurological status compared to baseline. Results discussed with Neurology and patient. Right groin sheath was removed, manual compression held to hemostasis for 21 minutes, no active bleeding, no hematoma, quick clot and safeguard balloon dressing applied at 1015am. STAT labs: CBC BMP at 1400 hours. Interventional Neuro- Radiology   Procedure Note PA-C    Procedure: Selective Cerebral Angiography   Pre- Procedure Diagnosis: moyamoya  Post- Procedure Diagnosis: no moyamoya     : Dr Rob Medina  Resident: Dr Mac Todd  Physician Assistant: Heena Bravo PA-C    Nurse:                        Briseida Gagnon RN  Radiologic Tech:        Christopher D'Amico LRT      Anesthesiologist:        Dr Gorge Wang   Sheath:                       4 [place] short sheath    I/Os EBL less than 10ccIV fluids:100cc Urine output due to void Contrast Omnipaque 240 156 cc           Vitals: /67    HR 85    Spo2 100%    Preliminary Report:  Using a 4 [place] short sheath to the right groin under MAC sedation via left vertebral artery, left common carotid artery, right vertebral artery, right internal carotid artery, right internal artery a selective cerebral angiography was performed and demonstrated no evidence of moyamoya or significant stenosis. Significant tortuosity of the right M2 territory. Also of note multiple small radius of vascular loops.    Patient tolerated procedure well, hemodynamically stable, no change in neurological status compared to baseline. Results were discussed with neurology and patient. Right groin sheath was removed, manual compression held to hemostasis for 21 minutes, no active bleeding, no hematoma, quick clot and safeguard balloon dressing applied at 1015am. STAT labs: CBC BMP at 1400 hours. Disposition recovery room for four hours. Interventional Neuro- Radiology   Procedure Note PA-C    Procedure: Selective Cerebral Angiography   Pre- Procedure Diagnosis: moyamoya  Post- Procedure Diagnosis: no moyamoya     : Dr Rob Medina  Resident: Dr Mac Todd  Physician Assistant: Heena Bravo    Nurse:                        Briseida Gagnon RN  Radiologic Tech:        Christopher D'Amico LRT      Anesthesiologist:        Dr Gorge Wang   Sheath:                       4 [place] Island[place] short sheath    I/Os EBL less than 10ccIV fluids:100cc Urine output due to void Contrast Omnipaque 240 156 cc           Vitals: /67    HR 85    Spo2 100%    Preliminary Report:  Using a 4 [place] Island[place] short sheath to the right groin under MAC sedation via left vertebral artery, left common carotid artery, right vertebral artery, right internal carotid artery, right internal artery a selective cerebral angiography was performed and demonstrated no evidence of moyamoya or significant stenosis. Significant tortuosity of the right M2 territory. Also of note multiple small radius of vascular loops.    Patient tolerated procedure well, hemodynamically stable, no change in neurological status compared to baseline. Results were discussed with neurology and patient. Right groin sheath was removed, manual compression held to hemostasis for 21 minutes, no active bleeding, no hematoma, quick clot and safeguard balloon dressing applied at 1015am. STAT labs: CBC BMP at 1400 hours. Disposition recovery room for four hours. Interventional Neuro- Radiology   Procedure Note PA-C    Procedure: Selective Cerebral Angiography   Pre- Procedure Diagnosis: moyamoya  Post- Procedure Diagnosis: no moyamoya     : Dr Rob Medina  Resident: Dr Mac Todd  Physician Assistant: Heena Bravo    Nurse:                        Briseida Gagnon RN  Radiologic Tech:        Christopher D'Amico LRT      Anesthesiologist:        Dr Gorge Wang   Sheath:                       4 [place] short sheath    I/Os EBL less than 10ccIV fluids:100cc Urine output due to void Contrast Omnipaque 240 156 cc           Vitals: /67    HR 85    Spo2 100%    Preliminary Report:  Using a 4 [place]ian short sheath to the right groin under MAC sedation via left vertebral artery, left common carotid artery, right vertebral artery, right internal carotid artery, right external carotid artery a selective cerebral angiography was performed and demonstrated no evidence of moyamoya or significant stenosis. Significant tortuosity of the right M2 territory. Also of note multiple small radius of vascular loops.    Patient tolerated procedure well, hemodynamically stable, no change in neurological status compared to baseline. Results were discussed with neurology and patient. Right groin sheath was removed, manual compression held to hemostasis for 21 minutes, no active bleeding, no hematoma, quick clot and safeguard balloon dressing applied at 1015am. STAT labs: CBC BMP at 1400 hours. Disposition recovery room for four hours.

## 2018-10-26 NOTE — PROGRESS NOTE ADULT - SUBJECTIVE AND OBJECTIVE BOX
THE PATIENT WAS SEEN AND EXAMINED BY ME WITH THE HOUSESTAFF AND STROKE TEAM DURING MORNING ROUNDS.   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 Patient reported that he woke up with changes to sensation on Monday morning, 10/22. Patient stated that his left leg below the knee was experiencing burning paresthesia, above the knee numbness; his left arm was experiencing numbness with sharp pain in his antecubital fossa. Patient stated that the sensation changes have been persistent, not improving with medication or movement. Patient denied any other neurological changes. Patient denied fevers, chills, ns, cp, sob, abd pain, n/v/d/c, weakness, or changes to his weight or senses.    SUBJECTIVE: No events overnight.  No new neurologic complaints.      aspirin enteric coated 81 milliGRAM(s) Oral daily  enoxaparin Injectable 40 milliGRAM(s) SubCutaneous every 24 hours  gabapentin 300 milliGRAM(s) Oral three times a day      PHYSICAL EXAM:   Vital Signs Last 24 Hrs  T(C): 36.7 (26 Oct 2018 05:05), Max: 36.7 (26 Oct 2018 05:05)  T(F): 98.1 (26 Oct 2018 05:05), Max: 98.1 (26 Oct 2018 05:05)  HR: 72 (26 Oct 2018 05:05) (57 - 79)  BP: 97/56 (26 Oct 2018 05:05) (97/56 - 119/79)  RR: 20 (26 Oct 2018 05:05) (15 - 20)  SpO2: 98% (26 Oct 2018 05:05) (98% - 100%)    General: No acute distress  HEENT: EOM intact, visual fields full  Abdomen: Soft, nontender, nondistended   Extremities: No edema    NEUROLOGICAL EXAM:  Mental status: Awake, alert, oriented x3, no aphasia, no neglect, normal memory   Cranial Nerves: No facial asymmetry, no nystagmus, no dysarthria,  tongue midline  Motor exam:  5/5 RUE, 5/5 RLE, mild atrophy of his left leg more so than left arm; mildly increased tone on the left; left side: deltoid 4 -/5; wrist extensors 3+/5; iliopsoas 4 -/5; anteriortibialis 5/5; normal fine finger movements.  Sensation: Intact to light touch   Coordination/ Gait:  gait not assessed, at baseline uses a cane    LABS:                        13.6   6.7   )-----------( 242      ( 25 Oct 2018 01:55 )             42.3    10-25    141  |  104  |  18  ----------------------------<  89  3.9   |  26  |  1.02    Ca    9.5      25 Oct 2018 01:55    TPro  6.9  /  Alb  4.6  /  TBili  0.4  /  DBili  x   /  AST  22  /  ALT  14  /  AlkPhos  49  10-25  PT/INR - ( 25 Oct 2018 01:57 )   PT: 13.7 sec;   INR: 1.25 ratio         PTT - ( 25 Oct 2018 01:57 )  PTT:32.6 sec      IMAGING: Reviewed by me.   CT HEAD (10/25/18): No acute intracranial hemorrhage, mass effect, or evidence of acute   territorial infarct. There is a small asymmetric region of   low-attenuation in the right frontal centrum semiovale which is   nonspecific. The calvarium, skull base and visualized facial bones are   intact. The paranasal sinuses and mastoid cells are well-aerated.    CT CERVICAL (10/25/18):   There is no evidence of displaced fracture or compression deformity. The   vertebral bodies and facet joints are well aligned. The craniocervical   and cervicothoracic junctions are grossly intact.    The paraspinous soft tissues are grossly unremarkable, within limits of   CT technique.    There is no suspicious lytic or blastic lesion.   Mild degenerative changes in the form of osteophyte formation are noted   at C4-5, and C5-6 levels. There is no significant spinal canal stenosis   or bony neuroforaminal narrowing  CTA HEAD/NECK: Marked narrowing of the right ICA terminus with hypoplastic A1 segment   anterior cerebral artery and overall decreased caliber right middle   cerebral artery. Asymmetric cluster of small vessels around the right   middle cerebral artery suggests that this may be due to underlying   unilateral moyamoya syndrome. Vasculitis is in the differential. No focal   occlusion or aneurysm of the intracranial circulation.    MRA HEAD/NECK (10/25/18): There is a smaller caliber to the right cervical internal carotid artery   as well as the right anterior and middle cerebral artery branches   compared with the left. The right middle cerebral artery branches are   smaller in caliber compared with left and have a somewhat irregular   appearance suspicious for a chronic inflammatory process.   MRI BRAIN : No acute intracranial hemorrhage, mass effect, vasogenic edema, or   evidence of acute territorial infarct.  Chronic right lentiform nucleus infarct.  No abnormal parenchymal or leptomeningeal enhancement. THE PATIENT WAS SEEN AND EXAMINED BY ME WITH THE HOUSESTAFF AND STROKE TEAM DURING MORNING ROUNDS.   HPI:  37yo Man with a reported history of Stroke vs. "Polio" at age 2 with residual left hemiparesis, HLD, presented with complaints of left sided amor sensory loss. Patient reported that he woke up with changes to sensation on Monday morning, 10/22. Patient stated that his left leg below the knee was experiencing burning paresthesia, above the knee numbness; his left arm was experiencing numbness with sharp pain in his antecubital fossa. Patient stated that the sensation changes have been persistent, not improving with medication or movement. Patient denied any other neurological changes. Patient denied fevers, chills, ns, cp, sob, abd pain, n/v/d/c, weakness, or changes to his weight or senses.    SUBJECTIVE: No events overnight.  No new neurologic complaints.      aspirin enteric coated 81 milliGRAM(s) Oral daily  enoxaparin Injectable 40 milliGRAM(s) SubCutaneous every 24 hours  gabapentin 300 milliGRAM(s) Oral three times a day      PHYSICAL EXAM:   Vital Signs Last 24 Hrs  T(C): 36.7 (26 Oct 2018 05:05), Max: 36.7 (26 Oct 2018 05:05)  T(F): 98.1 (26 Oct 2018 05:05), Max: 98.1 (26 Oct 2018 05:05)  HR: 72 (26 Oct 2018 05:05) (57 - 79)  BP: 97/56 (26 Oct 2018 05:05) (97/56 - 119/79)  RR: 20 (26 Oct 2018 05:05) (15 - 20)  SpO2: 98% (26 Oct 2018 05:05) (98% - 100%)    General: No acute distress  HEENT: EOM intact, visual fields full  Abdomen: Soft, nontender, nondistended   Extremities: No edema    NEUROLOGICAL EXAM:  Mental status: Awake, alert, oriented x3, no aphasia, no neglect, normal memory   Cranial Nerves: No facial asymmetry, no nystagmus, no dysarthria,  tongue midline  Motor exam:  5/5 RUE, 5/5 RLE, mild atrophy of his left leg more so than left arm; mildly increased tone on the left; left side: deltoid 4 -/5; wrist extensors 3+/5; iliopsoas 4 -/5; anteriortibialis 5/5; normal fine finger movements.  Sensation: Intact to light touch   Coordination/ Gait:  gait not assessed, at baseline uses a cane    LABS:                        13.6   6.7   )-----------( 242      ( 25 Oct 2018 01:55 )             42.3    10-25    141  |  104  |  18  ----------------------------<  89  3.9   |  26  |  1.02    Ca    9.5      25 Oct 2018 01:55    TPro  6.9  /  Alb  4.6  /  TBili  0.4  /  DBili  x   /  AST  22  /  ALT  14  /  AlkPhos  49  10-25  PT/INR - ( 25 Oct 2018 01:57 )   PT: 13.7 sec;   INR: 1.25 ratio         PTT - ( 25 Oct 2018 01:57 )  PTT:32.6 sec      IMAGING: Reviewed by me.   CT HEAD (10/25/18): No acute intracranial hemorrhage, mass effect, or evidence of acute   territorial infarct. There is a small asymmetric region of   low-attenuation in the right frontal centrum semiovale which is   nonspecific. The calvarium, skull base and visualized facial bones are   intact. The paranasal sinuses and mastoid cells are well-aerated.    CT CERVICAL (10/25/18):   There is no evidence of displaced fracture or compression deformity. The   vertebral bodies and facet joints are well aligned. The craniocervical   and cervicothoracic junctions are grossly intact.    The paraspinous soft tissues are grossly unremarkable, within limits of   CT technique.    There is no suspicious lytic or blastic lesion.   Mild degenerative changes in the form of osteophyte formation are noted   at C4-5, and C5-6 levels. There is no significant spinal canal stenosis   or bony neuroforaminal narrowing  CTA HEAD/NECK: Marked narrowing of the right ICA terminus with hypoplastic A1 segment   anterior cerebral artery and overall decreased caliber right middle   cerebral artery. Asymmetric cluster of small vessels around the right   middle cerebral artery suggests that this may be due to underlying   unilateral moyamoya syndrome. Vasculitis is in the differential. No focal   occlusion or aneurysm of the intracranial circulation.    MRA HEAD/NECK (10/25/18): There is a smaller caliber to the right cervical internal carotid artery   as well as the right anterior and middle cerebral artery branches   compared with the left. The right middle cerebral artery branches are   smaller in caliber compared with left and have a somewhat irregular   appearance suspicious for a chronic inflammatory process.   MRI BRAIN : No acute intracranial hemorrhage, mass effect, vasogenic edema, or   evidence of acute territorial infarct.  Chronic right lentiform nucleus infarct.  No abnormal parenchymal or leptomeningeal enhancement.

## 2018-10-26 NOTE — DISCHARGE NOTE ADULT - CARE PROVIDER_API CALL
Eugenia Thomas (NP; RN), NP in Family Health  [address] [address]  Phone: [phone]  Fax: [phone]
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
## 2018-10-26 NOTE — DISCHARGE NOTE ADULT - MEDICATION SUMMARY - MEDICATIONS TO TAKE
I will START or STAY ON the medications listed below when I get home from the hospital:    aspirin 81 mg oral delayed release tablet  -- 1 tab(s) by mouth once a day  -- Indication: For prevention    gabapentin 300 mg oral capsule  -- 1 cap(s) by mouth 3 times a day  -- Indication: For neuropathic pain

## 2018-10-26 NOTE — DISCHARGE NOTE ADULT - PATIENT PORTAL LINK FT
You can access the [facility] Patient Portal, offered by [facility], by registering with the following website: [email or web address]

## 2018-10-26 NOTE — PROGRESS NOTE ADULT - SUBJECTIVE AND OBJECTIVE BOX
Patient denies chest pain or shortness of breath.   Review of systems otherwise (-)  	  MEDICATIONS:  MEDICATIONS  (STANDING):  aspirin enteric coated 81 milliGRAM(s) Oral daily  enoxaparin Injectable 40 milliGRAM(s) SubCutaneous every 24 hours  gabapentin 300 milliGRAM(s) Oral three times a day  sodium chloride 0.9%. 1000 milliLiter(s) (70 mL/Hr) IV Continuous <Continuous>      LABS:	 	    CARDIAC MARKERS:                                14.0   6.53  )-----------( 246      ( 26 Oct 2018 10:47 )             42.0     Hemoglobin: 14.0 g/dL (10-26 @ 10:47)  Hemoglobin: 13.6 g/dL (10-25 @ 01:55)      10-26    141  |  105  |  16  ----------------------------<  99  4.4   |  24  |  1.04    Ca    9.1      26 Oct 2018 07:40    TPro  6.9  /  Alb  4.6  /  TBili  0.4  /  DBili  x   /  AST  22  /  ALT  14  /  AlkPhos  49  10-25    Creatinine Trend: 1.04<--, 1.02<--    COAGS:       proBNP:   Lipid Profile:   HgA1c: Hemoglobin A1C, Whole Blood: 5.5 % (10-26 @ 10:47)    TSH:       PHYSICAL EXAM:  T(C): 36.4 (10-26-18 @ 08:14), Max: 36.7 (10-26-18 @ 05:05)  HR: 76 (10-26-18 @ 08:14) (59 - 79)  BP: 107/72 (10-26-18 @ 08:14) (97/56 - 114/72)  RR: 18 (10-26-18 @ 08:14) (18 - 20)  SpO2: 98% (10-26-18 @ 08:14) (98% - 99%)  Wt(kg): --  I&O's Summary        Gen: Appears well in NAD  HEENT:  (-)icterus (-)pallor  CV: N S1 S2 1/6 DAVID (+)2 Pulses B/l  Resp:  Clear to ausculatation B/L, normal effort  GI: (+) BS Soft, NT, ND  Lymph:  (-)Edema, (-)obvious lymphadenopathy  Skin: Warm to touch, Normal turgor  Psych: Appropriate mood and affect        ASSESSMENT/PLAN: 	38y  Male admitted with amor sensory loss found with narrowing of the ICA ? [place]    - s/p cerebral angio,  - Neuro f/u  - No embolic CVA on MRI  - Neuro f/u  - F/U with Dr. Adair and Nan Robles as an outpt  - Will follow with you    Edwin Andre MD, FACC
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
## 2018-11-21 ENCOUNTER — APPOINTMENT (OUTPATIENT)
Dept: NEUROLOGY | Facility: CLINIC | Age: 38
End: 2018-11-21

## 2019-03-05 ENCOUNTER — EMERGENCY (EMERGENCY)
Facility: HOSPITAL | Age: 39
LOS: 1 days | Discharge: HOME | End: 2019-03-05
Attending: PERSONAL EMERGENCY RESPONSE ATTENDANT
Payer: COMMERCIAL

## 2019-03-05 VITALS
OXYGEN SATURATION: 98 % | SYSTOLIC BLOOD PRESSURE: 105 MMHG | HEART RATE: 72 BPM | DIASTOLIC BLOOD PRESSURE: 78 MMHG | RESPIRATION RATE: 19 BRPM

## 2019-03-05 VITALS
HEIGHT: 69 IN | WEIGHT: 160.06 LBS | HEART RATE: 70 BPM | RESPIRATION RATE: 18 BRPM | TEMPERATURE: 97 F | SYSTOLIC BLOOD PRESSURE: 105 MMHG | OXYGEN SATURATION: 100 % | DIASTOLIC BLOOD PRESSURE: 68 MMHG

## 2019-03-05 PROCEDURE — 73090 X-RAY EXAM OF FOREARM: CPT

## 2019-03-05 PROCEDURE — 73080 X-RAY EXAM OF ELBOW: CPT

## 2019-03-05 PROCEDURE — 99283 EMERGENCY DEPT VISIT LOW MDM: CPT | Mod: 25

## 2019-03-05 PROCEDURE — 73080 X-RAY EXAM OF ELBOW: CPT | Mod: 26,LT

## 2019-03-05 PROCEDURE — 73090 X-RAY EXAM OF FOREARM: CPT | Mod: 26,LT

## 2019-03-05 PROCEDURE — 99284 EMERGENCY DEPT VISIT MOD MDM: CPT

## 2019-03-05 RX ORDER — IBUPROFEN 200 MG
600 TABLET ORAL ONCE
Qty: 0 | Refills: 0 | Status: COMPLETED | OUTPATIENT
Start: 2019-03-05 | End: 2019-03-05

## 2019-03-05 RX ADMIN — Medication 600 MILLIGRAM(S): at 08:13

## 2019-03-05 RX ADMIN — Medication 600 MILLIGRAM(S): at 10:44
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
## 2019-03-05 NOTE — ED PROVIDER NOTE - PROGRESS NOTE DETAILS
Attending MD Ruiz.  Pt is in no new/acute distress.  Remains nvsc intact.  No new sxs/injuries.  XR non-actionable.  Pt referred to sports clinic for ongoing management.  Return to ED for new/worsening sxs.

## 2019-03-05 NOTE — ED PROVIDER NOTE - NSFOLLOWUPCLINICS_GEN_ALL_ED_FT
[facility]  Sports Medicine  [address], [address]  [place] [address]  Phone: [phone]  Fax:   Follow Up Time:
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
## 2019-03-05 NOTE — ED PROVIDER NOTE - MUSCULOSKELETAL, MLM
TTP over the anterior L elbow and medial and lateral epicondyles, and throughout the anterior L forearm. Neurovascularly intact distally.

## 2019-03-05 NOTE — ED ADULT NURSE NOTE - NS_ED_NURSE_TEACHING_TOPIC_ED_A_ED
follow up with sports medicine clinic (information provided). Return for worsening s/s. Patient verbalized understanding of discharge instructions/Orthopedic

## 2019-03-05 NOTE — ED ADULT NURSE REASSESSMENT NOTE - NS ED NURSE REASSESS COMMENT FT1
Patient states feels better at this time. Pain is 1/10 in left elbow upon movement and palpation. Patient denies any new numbness, tingling, swelling. A&OX3. Safety and comfort measures provided.

## 2019-03-05 NOTE — ED PROVIDER NOTE - CLINICAL SUMMARY MEDICAL DECISION MAKING FREE TEXT BOX
Jonathan Weil, PGY2 - chronic L elbow and forearm pain, will obtain xrays, d/c to f/u w/ sports med if xrays unrevealing.
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
## 2019-03-05 NOTE — ED ADULT NURSE NOTE - NSIMPLEMENTINTERV_GEN_ALL_ED
Implemented All Fall Risk Interventions:  [place] to call system. Call bell, personal items and telephone within reach. Instruct patient to call for assistance. Room bathroom lighting operational. Non-slip footwear when patient is off stretcher. Physically safe environment: no spills, clutter or unnecessary equipment. Stretcher in lowest position, wheels locked, appropriate side rails in place. Provide visual cue, wrist band, yellow gown, etc. Monitor gait and stability. Monitor for mental status changes and reorient to person, place, and time. Review medications for side effects contributing to fall risk. Reinforce activity limits and safety measures with patient and family.

## 2019-03-05 NOTE — ED PROVIDER NOTE - CHIEF COMPLAINT
The patient is a 39y Male complaining of arm pain/injury. The patient is a 39y Male complaining of arm pain

## 2019-03-05 NOTE — ED PROVIDER NOTE - NEUROLOGICAL, MLM
Alert and oriented, 4+/5 strength LUE and LLE (baseline), no other focal strength or sensory deficits.

## 2019-03-05 NOTE — ED PROVIDER NOTE - NSFOLLOWUPINSTRUCTIONS_ED_ALL_ED_FT
Follow up with sports medicine in 2-3 days.    You can take acetaminophen (aka tylenol, 650 mg every 4 hours) or ibuprofen (600 mg every 6 hours) for pain or fever. Do not exceed 4000 mg acetaminophen (tylenol) in a 24 hour period. Be aware if any of your other medications contain acetaminophen so as not to exceed the maximum daily dose.    Return to the ER for worsening or severe intractable pain, vomiting, fevers, new weakness or numbness, or any other new concerning symptoms.

## 2019-03-19 ENCOUNTER — APPOINTMENT (OUTPATIENT)
Dept: SPORTS MEDICINE | Facility: CLINIC | Age: 39
End: 2019-03-19
Payer: COMMERCIAL

## 2019-03-19 DIAGNOSIS — M75.22 BICIPITAL TENDINITIS, LEFT SHOULDER: ICD-10-CM

## 2019-03-19 DIAGNOSIS — M77.12 LATERAL EPICONDYLITIS, LEFT ELBOW: ICD-10-CM

## 2019-03-19 PROCEDURE — 99213 OFFICE O/P EST LOW 20 MIN: CPT

## 2019-03-19 PROCEDURE — 99205 OFFICE O/P NEW HI 60 MIN: CPT

## 2019-03-19 NOTE — PHYSICAL EXAM
[de-identified] : Elbow\par General Appearance: Well-nourished, well developed in no acute distress\par Orientation: Oriented to person, place and time.\par Mood / Affect: Calm\par Gait: normal\par Coordination: normal\par Pulm: no obvious respiratory distress, no audible stridor or wheeze\par LEFT Elbow\par No gross deformities; normal skin, no ecchymosis or crepitus\par Tenderness to palpation over the lateral epicondyle and distal biceps.\par Pain at the lateral upper condyle with resisted extension. Pain in the antecubital fossa with resisted flexion.\par Elbow ROM: 0-130\par Suppination: 80\par Pronation: 80\par No varus or valgus laxity bilaterally\par Negative tinnel's sign\par Biceps: 4/5\par Triceps: 4/5\par Wrist Extension: 4/5\par Wrist Flexion:4/5\par Intrinsics: 4/5\par Sensation: Subjective normal median, ulnar, radial and axillary sensation bilaterally\par Vasculature: 2+ radial pulse bilaterally \par      Attending note. I agree with the above. Patient is alert and in acute distress. Patient's left trapezius, clavicle, a.c. joint and proximal humerus/shoulder are nontender to palpation. Patient has a tenderness to the biceps or triceps muscle bellies. She has generalized tenderness in the antecubital fossa, medial and lateral epicondyles. There is no tenderness over the olecranon. Patient also has tenderness of the brachioradialis. Wrist and hand are nontender. DTRs are +2/4 equal and symmetrical. Distal pulses are intact. Sensation is intact. Patient has general weakness in the left upper extremity with triceps weaker than biceps.
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
## 2019-03-28 ENCOUNTER — MOBILE ON CALL (OUTPATIENT)
Age: 39
End: 2019-03-28

## 2019-04-02 PROBLEM — M75.22 BICEPS TENDINITIS OF LEFT UPPER EXTREMITY: Status: ACTIVE | Noted: 2019-04-02

## 2019-04-02 PROBLEM — M77.12 LATERAL EPICONDYLITIS OF LEFT ELBOW: Status: ACTIVE | Noted: 2019-04-02

## 2019-06-26 ENCOUNTER — APPOINTMENT (OUTPATIENT)
Dept: PHYSICAL MEDICINE AND REHAB | Facility: CLINIC | Age: 39
End: 2019-06-26

## 2020-02-05 NOTE — PATIENT PROFILE ADULT - FALL HARM RISK CONCLUSION
Curvilinear Excision Additional Text (Leave Blank If You Do Not Want): The margin was drawn around the clinically apparent lesion.  A curvilinear shape was then drawn on the skin incorporating the lesion and margins.  Incisions were then made along these lines to the appropriate tissue plane and the lesion was extirpated. Fall Risk

## 2020-05-06 ENCOUNTER — TRANSCRIPTION ENCOUNTER (OUTPATIENT)
Age: 40
End: 2020-05-06

## 2021-09-01 ENCOUNTER — RESULT REVIEW (OUTPATIENT)
Age: 41
End: 2021-09-01

## 2021-09-01 ENCOUNTER — OUTPATIENT (OUTPATIENT)
Dept: OUTPATIENT SERVICES | Facility: HOSPITAL | Age: 41
LOS: 1 days | End: 2021-09-01
Payer: COMMERCIAL

## 2021-09-01 ENCOUNTER — APPOINTMENT (OUTPATIENT)
Dept: CT IMAGING | Facility: IMAGING CENTER | Age: 41
End: 2021-09-01
Payer: COMMERCIAL

## 2021-09-01 DIAGNOSIS — Z00.8 ENCOUNTER FOR OTHER GENERAL EXAMINATION: ICD-10-CM

## 2021-09-01 PROCEDURE — 71275 CT ANGIOGRAPHY CHEST: CPT

## 2021-09-01 PROCEDURE — 71275 CT ANGIOGRAPHY CHEST: CPT | Mod: 26

## 2021-09-02 ENCOUNTER — APPOINTMENT (OUTPATIENT)
Dept: PULMONOLOGY | Facility: CLINIC | Age: 41
End: 2021-09-02
Payer: COMMERCIAL

## 2021-09-02 VITALS
SYSTOLIC BLOOD PRESSURE: 109 MMHG | OXYGEN SATURATION: 99 % | HEART RATE: 80 BPM | DIASTOLIC BLOOD PRESSURE: 73 MMHG | TEMPERATURE: 98.2 F

## 2021-09-02 DIAGNOSIS — R07.9 CHEST PAIN, UNSPECIFIED: ICD-10-CM

## 2021-09-02 PROCEDURE — 99204 OFFICE O/P NEW MOD 45 MIN: CPT | Mod: 25

## 2021-09-02 PROCEDURE — 36415 COLL VENOUS BLD VENIPUNCTURE: CPT

## 2021-09-02 RX ORDER — DEXAMETHASONE 6 MG/1
6 TABLET ORAL DAILY
Qty: 5 | Refills: 0 | Status: ACTIVE | COMMUNITY
Start: 2021-09-02 | End: 1900-01-01

## 2021-09-02 RX ORDER — AZITHROMYCIN 250 MG/1
250 TABLET, FILM COATED ORAL
Qty: 1 | Refills: 0 | Status: ACTIVE | COMMUNITY
Start: 2021-09-02 | End: 1900-01-01

## 2021-09-04 NOTE — REASON FOR VISIT
[Consultation] : a consultation [Shortness of Breath] : shortness of breath [Abnormal CXR/ Chest CT] : an abnormal CXR/ chest CT [Chest Pain] : chest pain

## 2021-09-04 NOTE — PROCEDURE
[FreeTextEntry1] : \par  CT Angio Chest PE Protocol w/ IV Cont             Final\par \par No Documents Attached\par \par \par \par \par   EXAM:  CT ANGIO CHEST PULM ART WAWIC\par \par \par PROCEDURE DATE:  09/01/2021\par \par \par \par INTERPRETATION:  CLINICAL INFORMATION: 41-year-old man with chest pain. Recent history Covid pneumonia\par \par COMPARISON: None.\par \par CONTRAST/COMPLICATIONS:\par IV Contrast: Omnipaque 350  80 cc administered   20 cc discarded\par Oral Contrast: NONE\par Complications: None reported at time of study completion\par \par PROCEDURE:\par CT Angiography of the Chest.\par Sagittal and coronal reformats were performed as well as 3D (MIP) reconstructions.\par \par FINDINGS:\par \par LUNGS AND AIRWAYS: Patent central airways.  Patchy groundglass opacities in predominantly peripheral distribution suggesting multifocal pneumonia including possible Covid etiology. Suggest follow-up in 2 months.\par PLEURA: No pleural effusion.\par MEDIASTINUM AND DEVIN: No lymphadenopathy.\par VESSELS: No pulmonary embolus.\par HEART: Heart size is normal. No pericardial effusion.\par CHEST WALL AND LOWER NECK: Within normal limits.\par VISUALIZED UPPER ABDOMEN: Within normal limits.\par BONES: Within normal limits.\par \par IMPRESSION:\par No pulmonary embolus.\par Multifocal pneumonia consistent with recent history of Covid pneumonia. Recommend follow-up in 2 months\par \par Findings discussed with Dr. RUBI Vergara on 09/01/2021 at 6:30 PM  with read back.\par \par \par \par --- End of Report ---\par \par \par \par \par \par \par CYN PUGH MD; Attending Radiologist\par This document has been electronically signed. Sep  2 2021  8:46AM\par \par  \par \par  Ordered by: BOB VERGARA       Collected/Examined: 01Sep2021 05:43PM       \par Verification Required       Stage: Final       \par  Performed at: [facility]       Resulted: 01Sep2021 06:07PM       Last Updated: 02Sep2021 08:49AM       Accession: [id number]

## 2021-09-04 NOTE — CONSULT LETTER
[Dear  ___] : Dear  [unfilled], [Courtesy Letter:] : I had the pleasure of seeing your patient, [unfilled], in my office today. [Please see my note below.] : Please see my note below. [Consult Closing:] : Thank you very much for allowing me to participate in the care of this patient.  If you have any questions, please do not hesitate to contact me. [Sincerely,] : Sincerely, [FreeTextEntry3] : Dr. Karin Hart

## 2021-09-04 NOTE — DISCUSSION/SUMMARY
[FreeTextEntry1] : Ricardo is a patient with left-sided pleuritic chest pain, secondary to COVID-19 pneumonia.

## 2021-09-04 NOTE — REVIEW OF SYSTEMS
[Cough] : cough [Dyspnea] : dyspnea [Negative] : Endocrine [Pleuritic Pain] : pleuritic pain [Chest Discomfort] : chest discomfort

## 2021-09-04 NOTE — HISTORY OF PRESENT ILLNESS
[TextBox_4] : Ricardo is a pleasant 41-year-old gentleman without significant past medical history, he contracted COVID-19 infection at the end of July, he had body aches, shortness of breath and coughing for 3 weeks then.  He is currently feeling better, still complains of left-sided pleuritic chest pain, he has minimal cough, but no shortness of breath, fever or chills.

## 2021-09-04 NOTE — ASSESSMENT
[FreeTextEntry1] : Venipuncture with labs drawn in office\par I am starting him on baby aspirin 81 mg p.o. daily at this point.  \par Also starting him on Dulera 200/5 mcg HFA, 2 puffs twice daily.  \par Start Z-Thai and dexamethasone.\par Will repeat chest x-ray for follow-up in 2 weeks.

## 2021-09-06 LAB
ALBUMIN SERPL ELPH-MCNC: 4.5 G/DL
ALP BLD-CCNC: 64 U/L
ALT SERPL-CCNC: 22 U/L
ANION GAP SERPL CALC-SCNC: 11 MMOL/L
AST SERPL-CCNC: 20 U/L
BASOPHILS # BLD AUTO: 0.03 K/UL
BASOPHILS NFR BLD AUTO: 0.7 %
BILIRUB SERPL-MCNC: 0.3 MG/DL
BUN SERPL-MCNC: 10 MG/DL
CALCIUM SERPL-MCNC: 9.7 MG/DL
CHLORIDE SERPL-SCNC: 103 MMOL/L
CO2 SERPL-SCNC: 27 MMOL/L
CREAT SERPL-MCNC: 1 MG/DL
CRP SERPL-MCNC: <3 MG/L
DEPRECATED D DIMER PPP IA-ACNC: 465 NG/ML DDU
EOSINOPHIL # BLD AUTO: 0.18 K/UL
EOSINOPHIL NFR BLD AUTO: 3.9 %
FERRITIN SERPL-MCNC: 152 NG/ML
GLUCOSE SERPL-MCNC: 91 MG/DL
HCT VFR BLD CALC: 43.5 %
HGB BLD-MCNC: 13.7 G/DL
IMM GRANULOCYTES NFR BLD AUTO: 0.2 %
LYMPHOCYTES # BLD AUTO: 1.9 K/UL
LYMPHOCYTES NFR BLD AUTO: 41.7 %
MAN DIFF?: NORMAL
MCHC RBC-ENTMCNC: 30.9 PG
MCHC RBC-ENTMCNC: 31.5 GM/DL
MCV RBC AUTO: 98.2 FL
MONOCYTES # BLD AUTO: 0.58 K/UL
MONOCYTES NFR BLD AUTO: 12.7 %
NEUTROPHILS # BLD AUTO: 1.86 K/UL
NEUTROPHILS NFR BLD AUTO: 40.8 %
PLATELET # BLD AUTO: 317 K/UL
POTASSIUM SERPL-SCNC: 4.8 MMOL/L
PROCALCITONIN SERPL-MCNC: 0.06 NG/ML
PROT SERPL-MCNC: 7.1 G/DL
RBC # BLD: 4.43 M/UL
RBC # FLD: 14.2 %
SODIUM SERPL-SCNC: 140 MMOL/L
WBC # FLD AUTO: 4.56 K/UL

## 2021-09-10 ENCOUNTER — APPOINTMENT (OUTPATIENT)
Dept: PULMONOLOGY | Facility: CLINIC | Age: 41
End: 2021-09-10
Payer: COMMERCIAL

## 2021-09-10 VITALS
SYSTOLIC BLOOD PRESSURE: 107 MMHG | TEMPERATURE: 97.8 F | HEART RATE: 84 BPM | DIASTOLIC BLOOD PRESSURE: 73 MMHG | OXYGEN SATURATION: 97 %

## 2021-09-10 DIAGNOSIS — J12.82 COVID-19: ICD-10-CM

## 2021-09-10 DIAGNOSIS — R06.02 SHORTNESS OF BREATH: ICD-10-CM

## 2021-09-10 DIAGNOSIS — U07.1 COVID-19: ICD-10-CM

## 2021-09-10 PROCEDURE — 99214 OFFICE O/P EST MOD 30 MIN: CPT | Mod: 25

## 2021-09-10 PROCEDURE — 36415 COLL VENOUS BLD VENIPUNCTURE: CPT

## 2021-09-10 PROCEDURE — 71046 X-RAY EXAM CHEST 2 VIEWS: CPT

## 2021-09-10 RX ORDER — PREDNISONE 10 MG/1
10 TABLET ORAL
Qty: 18 | Refills: 0 | Status: ACTIVE | COMMUNITY
Start: 2021-09-10 | End: 1900-01-01

## 2021-09-10 RX ORDER — AZITHROMYCIN 250 MG/1
250 TABLET, FILM COATED ORAL
Qty: 1 | Refills: 0 | Status: ACTIVE | COMMUNITY
Start: 2021-09-10 | End: 1900-01-01

## 2021-09-10 NOTE — PROCEDURE
[FreeTextEntry1] : \par  Xray Chest 2 Views PA/Lat             Final\par \par No Documents Attached\par \par \par \par \par   \par Chest x-ray PA and lateral views performed in my office today showed mild bilateral lower lobe interstitial infiltrates, from history of COVID-19 pneumonia, no evidence of pleural effusions. \par \par \par  Ordered by: TRISTIAN REVELES       Collected/Examined: 10Sep2021 05:07PM       \par Verification Required       Stage: Final       \par  Performed at: In Office       Performed by: TRISTIAN REVELES       Resulted: 10Sep2021 05:07PM       Last Updated: 10Sep2021 05:08PM

## 2021-09-10 NOTE — REASON FOR VISIT
[Follow-Up] : a follow-up visit [Abnormal CXR/ Chest CT] : an abnormal CXR/ chest CT [Shortness of Breath] : shortness of breath [TextBox_44] : Shortness of breath is slightly better, but still has fair amount of shortness of breath at this point.

## 2021-09-10 NOTE — PHYSICAL EXAM
Ultrasound guided thoracentesis, consent obtained by spouse. Patient to ultrasound via stretcher, placed on monitor x3, vitals taken, see flowsheet  Patient placed in right side lying position for comfort    Surgeon in 1240  Time out 1244  Start of procedure 1247  End of procedure 1257    Total amount of fluid aspirated is 220ml, bloody. Patient tolerated well. Chest xray completed after procedure. Patient returned to floor. Verbal report given to [employer], RN to include procedure, follow up care. Opportunity to ask questions provided. [No Acute Distress] : no acute distress [Normal Oropharynx] : normal oropharynx [Normal Appearance] : normal appearance [No Neck Mass] : no neck mass [Normal Rate/Rhythm] : normal rate/rhythm [Normal S1, S2] : normal s1, s2 [No Murmurs] : no murmurs [No Resp Distress] : no resp distress [Clear to Auscultation Bilaterally] : clear to auscultation bilaterally [No Abnormalities] : no abnormalities [Benign] : benign [Normal Gait] : normal gait [No Clubbing] : no clubbing [No Cyanosis] : no cyanosis [No Edema] : no edema [FROM] : FROM [Normal Color/ Pigmentation] : normal color/ pigmentation [No Focal Deficits] : no focal deficits [Oriented x3] : oriented x3 [Normal Affect] : normal affect

## 2021-09-10 NOTE — ASSESSMENT
[FreeTextEntry1] : Venipuncture with labs drawn in office\par Continue baby aspirin 81 mg p.o. daily at this point.  \par Continue Dulera 200/5 mcg HFA, 2 puffs twice daily.  \par Start Z-Thai and prednisone taper.\par Will repeat chest x-ray for follow-up in 2 weeks.

## 2021-09-12 LAB
ALBUMIN SERPL ELPH-MCNC: 4.3 G/DL
ALP BLD-CCNC: 56 U/L
ALT SERPL-CCNC: 20 U/L
ANION GAP SERPL CALC-SCNC: 12 MMOL/L
AST SERPL-CCNC: 14 U/L
BASOPHILS # BLD AUTO: 0.02 K/UL
BASOPHILS NFR BLD AUTO: 0.3 %
BILIRUB SERPL-MCNC: 0.4 MG/DL
BUN SERPL-MCNC: 17 MG/DL
CALCIUM SERPL-MCNC: 9.2 MG/DL
CHLORIDE SERPL-SCNC: 104 MMOL/L
CO2 SERPL-SCNC: 24 MMOL/L
CREAT SERPL-MCNC: 1 MG/DL
CRP SERPL-MCNC: <3 MG/L
DEPRECATED D DIMER PPP IA-ACNC: <150 NG/ML DDU
EOSINOPHIL # BLD AUTO: 0.12 K/UL
EOSINOPHIL NFR BLD AUTO: 1.6 %
FERRITIN SERPL-MCNC: 88 NG/ML
GLUCOSE SERPL-MCNC: 89 MG/DL
HCT VFR BLD CALC: 40.3 %
HGB BLD-MCNC: 13.2 G/DL
IMM GRANULOCYTES NFR BLD AUTO: 0.7 %
LYMPHOCYTES # BLD AUTO: 2.44 K/UL
LYMPHOCYTES NFR BLD AUTO: 32.3 %
MAN DIFF?: NORMAL
MCHC RBC-ENTMCNC: 31.1 PG
MCHC RBC-ENTMCNC: 32.8 GM/DL
MCV RBC AUTO: 95 FL
MONOCYTES # BLD AUTO: 0.71 K/UL
MONOCYTES NFR BLD AUTO: 9.4 %
NEUTROPHILS # BLD AUTO: 4.22 K/UL
NEUTROPHILS NFR BLD AUTO: 55.7 %
PLATELET # BLD AUTO: 231 K/UL
POTASSIUM SERPL-SCNC: 4.5 MMOL/L
PROCALCITONIN SERPL-MCNC: 0.04 NG/ML
PROT SERPL-MCNC: 6.5 G/DL
RBC # BLD: 4.24 M/UL
RBC # FLD: 14.9 %
SODIUM SERPL-SCNC: 140 MMOL/L
WBC # FLD AUTO: 7.56 K/UL

## 2021-09-24 ENCOUNTER — APPOINTMENT (OUTPATIENT)
Dept: PULMONOLOGY | Facility: CLINIC | Age: 41
End: 2021-09-24

## 2021-10-06 PROBLEM — U07.1 PNEUMONIA DUE TO COVID-19 VIRUS: Status: ACTIVE | Noted: 2021-09-02

## 2023-07-29 ENCOUNTER — INPATIENT (INPATIENT)
Facility: HOSPITAL | Age: 43
LOS: 1 days | Discharge: HOME | DRG: 556 | End: 2023-07-31
Attending: INTERNAL MEDICINE | Admitting: INTERNAL MEDICINE
Payer: COMMERCIAL

## 2023-07-29 VITALS
WEIGHT: 160.06 LBS | TEMPERATURE: 98 F | OXYGEN SATURATION: 99 % | HEART RATE: 65 BPM | DIASTOLIC BLOOD PRESSURE: 86 MMHG | HEIGHT: 70 IN | SYSTOLIC BLOOD PRESSURE: 128 MMHG | RESPIRATION RATE: 18 BRPM

## 2023-07-29 DIAGNOSIS — M54.9 DORSALGIA, UNSPECIFIED: ICD-10-CM

## 2023-07-29 LAB
ALBUMIN SERPL ELPH-MCNC: 4.1 G/DL — SIGNIFICANT CHANGE UP (ref 3.3–5)
ALP SERPL-CCNC: 47 U/L — SIGNIFICANT CHANGE UP (ref 40–120)
ALT FLD-CCNC: 20 U/L — SIGNIFICANT CHANGE UP (ref 10–45)
ANION GAP SERPL CALC-SCNC: 7 MMOL/L — SIGNIFICANT CHANGE UP (ref 5–17)
AST SERPL-CCNC: 19 U/L — SIGNIFICANT CHANGE UP (ref 10–40)
BILIRUB SERPL-MCNC: 0.4 MG/DL — SIGNIFICANT CHANGE UP (ref 0.2–1.2)
BUN SERPL-MCNC: 13 MG/DL — SIGNIFICANT CHANGE UP (ref 7–23)
CALCIUM SERPL-MCNC: 9 MG/DL — SIGNIFICANT CHANGE UP (ref 8.4–10.5)
CHLORIDE SERPL-SCNC: 106 MMOL/L — SIGNIFICANT CHANGE UP (ref 96–108)
CO2 SERPL-SCNC: 27 MMOL/L — SIGNIFICANT CHANGE UP (ref 22–31)
CREAT SERPL-MCNC: 0.92 MG/DL — SIGNIFICANT CHANGE UP (ref 0.5–1.3)
EGFR: 106 ML/MIN/1.73M2 — SIGNIFICANT CHANGE UP
GLUCOSE SERPL-MCNC: 92 MG/DL — SIGNIFICANT CHANGE UP (ref 70–99)
HCT VFR BLD CALC: 41.8 % — SIGNIFICANT CHANGE UP (ref 39–50)
HGB BLD-MCNC: 13.6 G/DL — SIGNIFICANT CHANGE UP (ref 13–17)
MCHC RBC-ENTMCNC: 31 PG — SIGNIFICANT CHANGE UP (ref 27–34)
MCHC RBC-ENTMCNC: 32.5 GM/DL — SIGNIFICANT CHANGE UP (ref 32–36)
MCV RBC AUTO: 95.2 FL — SIGNIFICANT CHANGE UP (ref 80–100)
NRBC # BLD: 0 /100 WBCS — SIGNIFICANT CHANGE UP (ref 0–0)
PLATELET # BLD AUTO: 226 K/UL — SIGNIFICANT CHANGE UP (ref 150–400)
POTASSIUM SERPL-MCNC: 4.6 MMOL/L — SIGNIFICANT CHANGE UP (ref 3.5–5.3)
POTASSIUM SERPL-SCNC: 4.6 MMOL/L — SIGNIFICANT CHANGE UP (ref 3.5–5.3)
PROT SERPL-MCNC: 6.5 G/DL — SIGNIFICANT CHANGE UP (ref 6–8.3)
RBC # BLD: 4.39 M/UL — SIGNIFICANT CHANGE UP (ref 4.2–5.8)
RBC # FLD: 13.6 % — SIGNIFICANT CHANGE UP (ref 10.3–14.5)
SODIUM SERPL-SCNC: 140 MMOL/L — SIGNIFICANT CHANGE UP (ref 135–145)
TSH SERPL-MCNC: 1.55 UIU/ML — SIGNIFICANT CHANGE UP (ref 0.27–4.2)
WBC # BLD: 4.93 K/UL — SIGNIFICANT CHANGE UP (ref 3.8–10.5)
WBC # FLD AUTO: 4.93 K/UL — SIGNIFICANT CHANGE UP (ref 3.8–10.5)

## 2023-07-29 PROCEDURE — 72131 CT LUMBAR SPINE W/O DYE: CPT | Mod: 26,MA

## 2023-07-29 PROCEDURE — 93971 EXTREMITY STUDY: CPT | Mod: 26,RT

## 2023-07-29 PROCEDURE — 99285 EMERGENCY DEPT VISIT HI MDM: CPT

## 2023-07-29 RX ORDER — ACETAMINOPHEN 500 MG
975 TABLET ORAL ONCE
Refills: 0 | Status: COMPLETED | OUTPATIENT
Start: 2023-07-29 | End: 2023-07-29

## 2023-07-29 RX ORDER — DIAZEPAM 5 MG
5 TABLET ORAL ONCE
Refills: 0 | Status: DISCONTINUED | OUTPATIENT
Start: 2023-07-29 | End: 2023-07-29

## 2023-07-29 RX ORDER — HEPARIN SODIUM 5000 [USP'U]/ML
5000 INJECTION INTRAVENOUS; SUBCUTANEOUS EVERY 12 HOURS
Refills: 0 | Status: DISCONTINUED | OUTPATIENT
Start: 2023-07-29 | End: 2023-07-31

## 2023-07-29 RX ORDER — PANTOPRAZOLE SODIUM 20 MG/1
40 TABLET, DELAYED RELEASE ORAL
Refills: 0 | Status: DISCONTINUED | OUTPATIENT
Start: 2023-07-29 | End: 2023-07-31

## 2023-07-29 RX ORDER — IBUPROFEN 200 MG
600 TABLET ORAL ONCE
Refills: 0 | Status: COMPLETED | OUTPATIENT
Start: 2023-07-29 | End: 2023-07-29

## 2023-07-29 RX ORDER — LIDOCAINE 4 G/100G
1 CREAM TOPICAL ONCE
Refills: 0 | Status: COMPLETED | OUTPATIENT
Start: 2023-07-29 | End: 2023-07-29

## 2023-07-29 RX ORDER — OXYCODONE AND ACETAMINOPHEN 5; 325 MG/1; MG/1
2 TABLET ORAL EVERY 6 HOURS
Refills: 0 | Status: DISCONTINUED | OUTPATIENT
Start: 2023-07-29 | End: 2023-07-31

## 2023-07-29 RX ADMIN — Medication 5 MILLIGRAM(S): at 06:44

## 2023-07-29 RX ADMIN — Medication 5 MILLIGRAM(S): at 04:15

## 2023-07-29 RX ADMIN — Medication 600 MILLIGRAM(S): at 12:54

## 2023-07-29 RX ADMIN — Medication 600 MILLIGRAM(S): at 05:56

## 2023-07-29 RX ADMIN — Medication 975 MILLIGRAM(S): at 06:22

## 2023-07-29 RX ADMIN — Medication 600 MILLIGRAM(S): at 04:15

## 2023-07-29 RX ADMIN — LIDOCAINE 1 PATCH: 4 CREAM TOPICAL at 06:22

## 2023-07-29 RX ADMIN — Medication 24 MILLIGRAM(S): at 18:11

## 2023-07-29 RX ADMIN — HEPARIN SODIUM 5000 UNIT(S): 5000 INJECTION INTRAVENOUS; SUBCUTANEOUS at 20:15

## 2023-07-29 NOTE — ED ADULT NURSE NOTE - OBJECTIVE STATEMENT
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 Patient states he received the MMR vaccine 2 days ago at which time felt a tingling sensation down the R leg. Following vaccination, patient states he went to bend down and felt pain from R groin area, radiating down the R leg. Notes pain 10/10 with movement. Patient took Percocet 2 hours prior to arrival without relief of symptoms. AOx4 and speaking coherently with mother at bedside. Breathing is unlabored, spontaneous, and symmetrical. Abdomen and bladder are nondistended. Limited ROM secondary to pain. Decreased strength to LUE. Ambulatory at baseline, however unable to ambulate secondary to pain. No midline spinal tenderness.

## 2023-07-29 NOTE — ED PROVIDER NOTE - CLINICAL SUMMARY MEDICAL DECISION MAKING FREE TEXT BOX
Patient is a 43-year-old male history of stroke as a child, with residual left-sided weakness, presenting with severe right lower leg pain.  Most likely sciatica versus disc herniation, will give pain medicine and reassess.  Patient currently unable to ambulate will need to be attempted ambulation again after medication. Low concern for spinal epidural abscess, transverse myelitis, acute cord compression, or cauda equina syndrome at this time.  The patient has no red flags including fever, chills, history of malignancy, history of intravenous drug use, recent spinal instrumentation, predominant nocturnal pain, history of immunosuppression, pelvic or perineal anesthesia or paresthesia, or fecal or urinary incontinence or retention. Patient is a 43-year-old male history of stroke as a child, with residual left-sided weakness, presenting with severe right lower leg pain.  Most likely sciatica versus disc herniation, will give pain medicine and reassess.  Patient currently unable to ambulate will need to be attempted ambulation again after medication. Low concern for spinal epidural abscess, transverse myelitis, acute cord compression, or cauda equina syndrome at this time.  The patient has no red flags including fever, chills, history of malignancy, history of intravenous drug use, recent spinal instrumentation, predominant nocturnal pain, history of immunosuppression, pelvic or perineal anesthesia or paresthesia, or fecal or urinary incontinence or retention.    VY Sims MD: Agree with resident/ACP MDM, assessment and plan as above. Pt with RLE pain s/p bending down to pick something up. Denies focal numbness/weakness, bowel/bladder incontinence, saddle anesthesia, IVDA, f/c. No red flag signs/sx concerning for cauda equina or cord compression. Pt with difficulty moving RLE 2/2 pain. Sensation intact. No significant improvement in pain after multiple medications. Unclear whether poor effort to move RLE is 2/2 pain or weakness, however, favor pain as pt still c/o significant pain. Will image spine, consult spine, reassess

## 2023-07-29 NOTE — CONSULT NOTE ADULT - ASSESSMENT
43M h/o chronic LBP,  p/f severe nonradic RLE pain + inability to ambulate 2/2 pain. Severe medial thigh TTP. No radic pain. CT L spine w/ no fx, mild L4-5 L foramen/L>R lat recess stenosis, mild L5-S1 bulge R>L lat recess stenosis. Exam: MEHTA 5/5. SILT b/l. No clonus.   - No acute nsgy intervention   - No contraindication to med admit for PT/pain control, w/u musculoskeletal RLE pain

## 2023-07-29 NOTE — CONSULT NOTE ADULT - SUBJECTIVE AND OBJECTIVE BOX
p (4050)     HPI:  Imaging:  Exam:  43M h/o chronic LBP,  p/f severe nonradic RLE pain + inability to ambulate 2/2 pain. Severe medial thigh TTP. No radic pain. CT L spine w/ no fx, mild L4-5 L foramen/L>R lat recess stenosis, mild L5-S1 bulge R>L lat recess stenosis. Exam: MEHTA 5/5. SILT b/l. No clonus.     --Anticoagulation:    =====================  PAST MEDICAL HISTORY   Hyperlipidemia      PAST SURGICAL HISTORY   No significant past surgical history      No Known Allergies      MEDICATIONS:  Antibiotics:    Neuro:  ibuprofen  Tablet. 600 milliGRAM(s) Oral Once    Other:      SOCIAL HISTORY:   Occupation:   Marital Status:     FAMILY HISTORY:  Family history of coronary artery disease in father (Father)        ROS: Negative except per HPI    LABS:   p (9530)     HPI: 43M h/o chronic LBP,  p/f severe nonradic RLE pain + inability to ambulate 2/2 pain. Severe medial thigh TTP. No radic pain. CT L spine w/ no fx, mild L4-5 L foramen/L>R lat recess stenosis, mild L5-S1 bulge R>L lat recess stenosis.   Exam: MEHTA 5/5. SILT b/l. No clonus.     --Anticoagulation: none    =====================  PAST MEDICAL HISTORY   Hyperlipidemia      PAST SURGICAL HISTORY   No significant past surgical history      No Known Allergies      MEDICATIONS:  Antibiotics:    Neuro:  ibuprofen  Tablet. 600 milliGRAM(s) Oral Once    Other:      SOCIAL HISTORY:   Occupation:   Marital Status:     FAMILY HISTORY:  Family history of coronary artery disease in father (Father)        ROS: Negative except per HPI    LABS:

## 2023-07-29 NOTE — H&P ADULT - ASSESSMENT
43M h/o cva as child w residual llext weakness   p/f severe RLE pain + inability to ambulate 2/2 pain.   Severe medial thigh   CT L spine w/ no fx, mild L4-5 L foramen/L>R lat recess stenosis, mild L5-S1 bulge R>L lat recess stenosis. Exam: MEHTA 5/5. SILT b/l. No clonus.   - No acute nsgy intervention as per neurosx  start medrol pack  mri l/s spine  ppi  analgesics  check r lext dopplers   pt

## 2023-07-29 NOTE — ED PROVIDER NOTE - PHYSICAL EXAMINATION
GENERAL: no acute distress, non-toxic appearing  HEAD: normocephalic, atraumatic  HEENT: PERRLA, EOMI, normal conjunctiva  CARDIAC: regular rate and rhythm  PULM: clear to ascultation bilaterally  GI: abdomen nondistended, soft, nontender  NEURO: alert and oriented x 3, normal speech, +straight leg raise right, 4/5 strength LUE and LLE, full ROM LLE and BUE, limites ROM RLE  MSK: no visible deformities, no midline spinal tenderness, no step offs, no deformities, +right paraspinal tenderness  SKIN: no visible rashes, dry, well-perfused  PSYCH: appropriate mood and affect

## 2023-07-29 NOTE — ED PROVIDER NOTE - NSFOLLOWUPINSTRUCTIONS_ED_ALL_ED_FT
You were seen in the ED for back pain.    Take Tylenol and/or Ibuprofen every 4-6 hours as needed for pain. You can also use a Lidocaine patch, 12 hours on, 12 hours off as needed for pain.    Follow-up with the [facility] at [phone] ([phone])    Please follow-up with your primary care doctor.    ***Return to the ED if you have any new or worsening symptoms such as fevers, lose control of urine or bowel movements, numbness in the legs, or any other concerning symptoms***    Back Pain    You were seen in the emergency department (ED) today for back pain. Acute back pain is the second most common reason for a physician visit and affects 80% to 85% of people over their lifetime. Most episodes of back pain are not serious and resolve within weeks with conservative therapy.    There are many causes of back pain. Most of the time, the pain is caused by conditions such as a muscle strain, inflammation or a bulging disc that cannot be identified on an X-ray or CT scan. Diagnostic imaging does not accurately identify the cause of most low back pain and therefore does not help guide therapy or improve the time to recovery.    Back pain is very common in adults. The cause of back pain is rarely dangerous and the pain often gets better over time. The cause of your back pain may not be known and may include strain of muscles or ligaments, degeneration of the spinal disks, or arthritis. Occasionally the pain may radiate down your leg(s). Over-the-counter medicines to reduce pain and inflammation are often the most helpful. Stretching and remaining active frequently helps the healing process.    Your provider today has determined that you are not exhibiting any of these worrisome symptoms or physical exam findings. The American College of Emergency Physicians, American College of Physicians, American Society of Anesthesiologists, and the American College of Radiology have all independently advised that acute imaging studies in patients with musculoskeletal low back pain are usually inappropriate and not necessary.    Your provider today has determined that you do not need an emergent MRI. This does not mean that you may not require an MRI as an outpatient in the future if your pain persists or if you develop additional neurologic symptoms.    It is extremely important for you to follow up with your outpatient provider for further examination and discussion regarding treatment and imaging, if necessary.    If you develop any of the following symptoms, return to the ED immediately for re-evaluation:    •Significant trauma or fall, especially if you are over age 65 or have osteoporosis  •Sudden, acute onset of urinary retention or incontinence  •Fecal incontinence  •Loss of sensation (anesthesia) restricted to the area of the buttocks, perineum and inner surfaces of the thighs  •Weakness in the lower limbs

## 2023-07-29 NOTE — ED PROVIDER NOTE - OBJECTIVE STATEMENT
Patient is a 43-year-old male history of stroke as a child, with residual left-sided weakness, presenting with severe right lower leg pain.  Patient states right leg pain started a couple days ago, started to get worse. He was in the shower he bent down to  this piece of soap and plan he had severe pain in the right leg.  Now unable to ambulate without significant pain, can barely move his leg.  Denies weakness in the leg.  At home patient tried a Percocet, also gabapentin, without relief.  He also tried a massage yesterday which did not help.  Denies fall, trauma, fevers, difficulty urinating, bowel incontinence.

## 2023-07-29 NOTE — ED PROVIDER NOTE - DIFFERENTIAL DIAGNOSIS
Ddx includes, however, is not limited to: lumbosacral radiculopathy, muscle spasm, disc herniation, other Differential Diagnosis

## 2023-07-29 NOTE — ED PROVIDER NOTE - NSICDXFAMILYHX_GEN_ALL_CORE_FT
FAMILY HISTORY:  Father  Still living? No  Family history of coronary artery disease in father, Age at diagnosis: Age Unknown

## 2023-07-29 NOTE — ED PROVIDER NOTE - PROGRESS NOTE DETAILS
Mona Zhou MD PGY3:   On exam patient is still unable to move leg despite multiple pain modalities.  Will get CT lumbar spine to further assess, after imaging consider consulting spine formally for evaluation.  Spine was consulted initially but would not see patient without imaging. Severo WILSON PGY3: CT showing radiculopathy.  Patient evaluated by neurosurgery no acute interventions.  Plan for admission for MRI and PT OT.  Spoke to Dr. Pastor who will admit patient

## 2023-07-29 NOTE — ED ADULT NURSE NOTE - NSFALLUNIVINTERV_ED_ALL_ED
Bed/Stretcher in lowest position, wheels locked, appropriate side rails in place/Call bell, personal items and telephone in reach/Instruct patient to call for assistance before getting out of bed/chair/stretcher/Non-slip footwear applied when patient is off stretcher/[employer] to call system/Physically safe environment - no spills, clutter or unnecessary equipment/Purposeful proactive rounding/Room/bathroom lighting operational, light cord in reach

## 2023-07-29 NOTE — H&P ADULT - HISTORY OF PRESENT ILLNESS
Patient is a 43-year-old male history of stroke as a child, with residual left-sided weakness, presenting with severe right lower leg pain.    Patient states right leg pain started a couple days ago, started to get worse.   He was in the shower he bent down to  this piece of soap and plan he had severe pain in the right leg.    Now unable to ambulate without significant pain, can barely move his leg.    At home patient tried a Percocet, also gabapentin, without relief.     Denies fall, trauma, fevers, difficulty urinating, bowel incontinence.

## 2023-07-29 NOTE — ED ADULT NURSE REASSESSMENT NOTE - NS ED NURSE REASSESS COMMENT FT1
RN called [employer] and was on hold with no answer, tried to call back twice with no answer either time RN called [employer] and was on hold with no answer, tried to call back twice with no answer either time, charge nurse made aware.

## 2023-07-29 NOTE — ED ADULT NURSE REASSESSMENT NOTE - NS ED NURSE REASSESS COMMENT FT1
Report received from DESHAUN Aquino. Pt A&Ox4, breathing spontaneously and unlabored, speaking full sentences, moving all extremities. Awaiting CT scan. Appropriate safety measures in place, call bell within reach.

## 2023-07-30 ENCOUNTER — TRANSCRIPTION ENCOUNTER (OUTPATIENT)
Age: 43
End: 2023-07-30

## 2023-07-30 PROCEDURE — 72148 MRI LUMBAR SPINE W/O DYE: CPT | Mod: 26

## 2023-07-30 RX ORDER — METHOCARBAMOL 500 MG/1
500 TABLET, FILM COATED ORAL
Refills: 0 | Status: DISCONTINUED | OUTPATIENT
Start: 2023-07-30 | End: 2023-07-31

## 2023-07-30 RX ADMIN — HEPARIN SODIUM 5000 UNIT(S): 5000 INJECTION INTRAVENOUS; SUBCUTANEOUS at 21:04

## 2023-07-30 RX ADMIN — Medication 4 MILLIGRAM(S): at 06:24

## 2023-07-30 RX ADMIN — HEPARIN SODIUM 5000 UNIT(S): 5000 INJECTION INTRAVENOUS; SUBCUTANEOUS at 08:55

## 2023-07-30 RX ADMIN — Medication 4 MILLIGRAM(S): at 18:56

## 2023-07-30 RX ADMIN — Medication 8 MILLIGRAM(S): at 21:03

## 2023-07-30 RX ADMIN — Medication 4 MILLIGRAM(S): at 14:12

## 2023-07-30 RX ADMIN — METHOCARBAMOL 500 MILLIGRAM(S): 500 TABLET, FILM COATED ORAL at 22:04

## 2023-07-30 RX ADMIN — PANTOPRAZOLE SODIUM 40 MILLIGRAM(S): 20 TABLET, DELAYED RELEASE ORAL at 06:15

## 2023-07-30 NOTE — DISCHARGE NOTE PROVIDER - HOSPITAL COURSE
43M h/o cva as child w residual llext weakness   p/f severe RLE pain + inability to ambulate 2/2 pain.   Severe medial thigh pain better   CT L spine w/ no fx, mild L4-5 L foramen/L>R lat recess stenosis, mild L5-S1 bulge R>L lat recess stenosis. Exam: MEHTA 5/5. SILT b/l. No clonus.   - No acute nsgy intervention as per neurosx  started  medrol pack w/ improvement  mri l/s spine can be done as outpt if ok w/ neuro  ppi  analgesics  neg  r lext dopplers   pt    d/c planning   43M h/o cva as child w residual llext weakness   p/f severe RLE pain + inability to ambulate 2/2 pain.   Severe medial thigh pain better.  CT L spine w/ no fx, mild L4-5 L foramen/L>R lat recess stenosis, mild L5-S1 bulge R>L lat recess stenosis. Exam: MEHTA 5/5. SILT b/l. No clonus.   No acute nsgy intervention as per neurosx  started  medrol pack w/ improvement  mri l/s spine can be done as outpt if ok w/ neuro  ppi  analgesics  neg  r lext dopplers   pt    d/c planning   43M h/o cva as child w residual llext weakness   p/f severe RLE pain + inability to ambulate 2/2 pain.   Severe medial thigh pain better.  CT L spine w/ no fx, mild L4-5 L foramen/L>R lat recess stenosis, mild L5-S1 bulge R>L lat recess stenosis. Exam: MHETA 5/5. SILT b/l. No clonus.   No acute nsgy intervention as per neurosx.  started  medrol pack w/ improvement.  mri l/s spine can be done as outpt if ok w/ neuro.  ppi  analgesics.  neg  r lext dopplers.  pt    d/c planning   43M h/o cva as child w residual llext weakness   p/f severe RLE pain + inability to ambulate 2/2 pain.   Severe medial thigh pain better.  CT L spine w/ no fx, mild L4-5 L foramen/L>R lat recess stenosis, mild L5-S1 bulge R>L lat recess stenosis. Exam: MEHTA 5/5. SILT b/l. No clonus.   No acute nsgy intervention as per neurosx.  started  medrol pack w/ improvement.  mri l/s spine can be done as outpt if ok w/ neuro.  ppi  analgesics.  neg  r lext dopplers.  pt    Medically cleared for discharge home with outpatient physical therapy.

## 2023-07-30 NOTE — CONSULT NOTE ADULT - SUBJECTIVE AND OBJECTIVE BOX
[facility]        Patient is a 43y old  Male who presents with a chief complaint of RLE Pain (2023 11:03)    Excerpt from H&P,"  HPI:  Patient is a 43-year-old male history of stroke as a child, with residual left-sided weakness, presenting with severe right lower leg pain.    Patient states right leg pain started a couple days ago, started to get worse.   He was in the shower he bent down to  this piece of soap and plan he had severe pain in the right leg.    Now unable to ambulate without significant pain, can barely move his leg.    At home patient tried a Percocet, also gabapentin, without relief.     Denies fall, trauma, fevers, difficulty urinating, bowel incontinence. (2023 13:08)           *****PAST MEDICAL / Surgical  HISTORY:  PAST MEDICAL & SURGICAL HISTORY:  Hyperlipidemia  diet controlled      CVA (cerebrovascular accident)      No significant past surgical history               *****FAMILY HISTORY:  FAMILY HISTORY:  Family history of coronary artery disease in father (Father)   MI 59yo             *****SOCIAL HISTORY:  Alcohol: None  Smoking: None         *****ALLERGIES:   Allergies    No Known Allergies    Intolerances             *****MEDICATIONS: current medication reviewed and documented.   MEDICATIONS  (STANDING):  heparin   Injectable 5000 Unit(s) SubCutaneous every 12 hours  methylPREDNISolone   Oral   methylPREDNISolone 4 milliGRAM(s) Oral before breakfast  methylPREDNISolone 4 milliGRAM(s) Oral after lunch  methylPREDNISolone 4 milliGRAM(s) Oral after dinner  pantoprazole    Tablet 40 milliGRAM(s) Oral before breakfast    MEDICATIONS  (PRN):  oxycodone    5 mG/acetaminophen 325 mG 2 Tablet(s) Oral every 6 hours PRN Severe Pain (7 - 10)           *****REVIEW OF SYSTEM:  GEN: no fever, no chills, no pain  RESP: no SOB, no cough, no sputum  CVS: no chest pain, no palpitations, no edema  GI: no abdominal pain, no nausea, no vomiting, no constipation, no diarrhea  : no dysurea, no frequency, no hematurea  Neuro: no headache, no dizziness  PSYCH: no anxiety, no depression  Derm : no itching, no rash         *****VITAL SIGNS:  T(C): 36.7 (23 @ 20:58), Max: 36.8 (23 @ 04:46)  HR: 69 (23 @ 20:58) (60 - 80)  BP: 110/69 (23 @ 20:58) (107/74 - 112/64)  RR: 18 (23 @ 20:58) (16 - 18)  SpO2: 96% (23 @ 20:58) (96% - 98%)  Wt(kg): --     @ 07:01  -   @ 21:09  --------------------------------------------------------  IN: 480 mL / OUT: 0 mL / NET: 480 mL             *****PHYSICAL EXAM:   Alert oriented x 3   Attention comprehension are fair. Able to name, repeat, read without any difficulty.   Able to follow 3 step commands.     EOMI fundi not visualized,  VFF to confrontration  No facial asymmetry   Tongue is midline   Palate elevates symmetrically   Moving all 4 ext symmetrically no pronator drift   Reflexes are symmetric throughout   sensation is grossly symmetric  Gait : not assessed.  B/L down going toes               *****LAB AND IMAGIN.6   4.93  )-----------( 226      ( 2023 13:58 )             41.8                   140  |  106  |  13  ----------------------------<  92  4.6   |  27  |  0.92    Ca    9.0      2023 13:58    TPro  6.5  /  Alb  4.1  /  TBili  0.4  /  DBili  x   /  AST  19  /  ALT  20  /  AlkPhos  47                              Urinalysis Basic - ( 2023 13:58 )    Color: x / Appearance: x / SG: x / pH: x  Gluc: 92 mg/dL / Ketone: x  / Bili: x / Urobili: x   Blood: x / Protein: x / Nitrite: x   Leuk Esterase: x / RBC: x / WBC x   Sq Epi: x / Non Sq Epi: x / Bacteria: x        [All pertinent recent Imaging reports reviewed]         *****A S S E S S M E N T   A N D   P L A N :        Problem/Recommendations 1:        Problem/Recommendations 2:         pt at risk for developing delirium, therefore please institute the following preventative measures if possible          - initiating early mobilization          -minimizing "tethers" - IV, oxygen, catheters, etc          -avoiding   sedatives          -maintaining hydration/nutrition          -avoid anticholinergics - diphenhydramine, etc          -pain control          -sleep wake cycle regulation; avoid day time somnolence           -supportive environment    ___________________________  Will follow with you.  Thank you,  Ashia Contreras MD  Diplomate of the American Board of Neurology and Psychiatry.  Diplomate of the American Board of Vascular Neurology.   [facility] ([facility]), [facility]   Ph: [phone]    Differential diagnosis and plan of care discussed with patient after the evaluation.   Advanced care planning options discussed.   Pain assessed and judicious use of narcotics when appropriate was discussed.  Importance of Fall prevention discussed.  Counseling on Smoking and Alcohol cessation was offered when appropriate.  Counseling on Diet, exercise, and medication compliance was done.   83 minutes spent on the total encounter;  more than 50 % of the visit was spent on counseling  and or coordinating care by the attending physician.    Thank you for allowing me to participate in the care of this you patient. Please do not hesitate to call me if you have any questions.     This and subsequent notes  will  inherently be subject to errors including those of syntax and substitutions which may escape proofreading. In such instances original meaning may be extrapolated by contextual derivation.    [facility]        Patient is a 43y old  Male who presents with a chief complaint of RLE Pain (2023 11:03)    Excerpt from H&P,"  HPI:  Patient is a 43-year-old male history of stroke as a child, with residual left-sided weakness, presenting with severe right lower leg pain.    Patient states right groin pain started a couple days ago, started to get worse.   He was in the shower he bent down to  this piece of soap and plan he had severe pain in the right leg.    Now unable to ambulate without significant pain, can barely move his leg.    At home patient tried a Percocet, also gabapentin, without relief.     Denies fall, trauma, fevers, difficulty urinating, bowel incontinence.   of note pt received MMR vaccine last week, for job requirement   no incontinence or saddle anesthesia              *****PAST MEDICAL / Surgical  HISTORY:  PAST MEDICAL & SURGICAL HISTORY:  Hyperlipidemia  diet controlled      CVA (cerebrovascular accident)      No significant past surgical history               *****FAMILY HISTORY:  FAMILY HISTORY:  Family history of coronary artery disease in father (Father)   MI 59yo             *****SOCIAL HISTORY:  Alcohol: None  Smoking: None         *****ALLERGIES:   Allergies    No Known Allergies    Intolerances             *****MEDICATIONS: current medication reviewed and documented.   MEDICATIONS  (STANDING):  heparin   Injectable 5000 Unit(s) SubCutaneous every 12 hours  methylPREDNISolone   Oral   methylPREDNISolone 4 milliGRAM(s) Oral before breakfast  methylPREDNISolone 4 milliGRAM(s) Oral after lunch  methylPREDNISolone 4 milliGRAM(s) Oral after dinner  pantoprazole    Tablet 40 milliGRAM(s) Oral before breakfast    MEDICATIONS  (PRN):  oxycodone    5 mG/acetaminophen 325 mG 2 Tablet(s) Oral every 6 hours PRN Severe Pain (7 - 10)           *****REVIEW OF SYSTEM:  GEN: no fever, no chills, no pain  RESP: no SOB, no cough, no sputum  CVS: no chest pain, no palpitations, no edema  GI: no abdominal pain, no nausea, no vomiting, no constipation, no diarrhea  : no dysurea, no frequency, no hematurea  Neuro: no headache, no dizziness  PSYCH: no anxiety, no depression  Derm : no itching, no rash         *****VITAL SIGNS:  T(C): 36.7 (23 @ 20:58), Max: 36.8 (23 @ 04:46)  HR: 69 (23 @ 20:58) (60 - 80)  BP: 110/69 (23 @ 20:58) (107/74 - 112/64)  RR: 18 (23 @ 20:58) (16 - 18)  SpO2: 96% (23 @ 20:58) (96% - 98%)  Wt(kg): --     @ 07:01  -   @ 21:09  --------------------------------------------------------  IN: 480 mL / OUT: 0 mL / NET: 480 mL             *****PHYSICAL EXAM:   Alert oriented x 3   Attention comprehension are fair. Able to name, repeat, read without any difficulty.   Able to follow 3 step commands.     EOMI fundi not visualized,  VFF to confrontration  No facial asymmetry   Tongue is midline   Palate elevates symmetrically   Moving all 4 ext symmetrically no pronator drift   Reflexes are symmetric throughout   sensation is grossly symmetric  Gait : normal   able to tandem, heel walk and toe walk   B/L down going toes               *****LAB AND IMAGIN.6   4.93  )-----------( 226      ( 2023 13:58 )             41.8               07-    140  |  106  |  13  ----------------------------<  92  4.6   |  27  |  0.92    Ca    9.0      2023 13:58    TPro  6.5  /  Alb  4.1  /  TBili  0.4  /  DBili  x   /  AST  19  /  ALT  20  /  AlkPhos  47                              Urinalysis Basic - ( 2023 13:58 )    Color: x / Appearance: x / SG: x / pH: x  Gluc: 92 mg/dL / Ketone: x  / Bili: x / Urobili: x   Blood: x / Protein: x / Nitrite: x   Leuk Esterase: x / RBC: x / WBC x   Sq Epi: x / Non Sq Epi: x / Bacteria: x        [All pertinent recent Imaging reports reviewed]         *****A S S E S S M E N T   A N D   P L A N :Patient is a 43-year-old male history of stroke as a child, with residual left-sided weakness, presenting with severe right lower leg pain.    Patient states right groin pain started a couple days ago, started to get worse.   He was in the shower he bent down to  this piece of soap and plan he had severe pain in the right leg.    Now unable to ambulate without significant pain, can barely move his leg.    At home patient tried a Percocet, also gabapentin, without relief.     Denies fall, trauma, fevers, difficulty urinating, bowel incontinence.   of note pt received MMR vaccine last week, for job requirement   no incontinence or saddle anesthesia           Problem/Recommendations 1: R groin pain of unclear etiology   ddx: Lymphadenopathy vs. muscle spasm     mri no sig l2-3 pathology   + tenderness in R groin to palpation  noted swelling   pt received MMR vaccine in the last week ? reactive   consider R thigh ultrasound to further clarify   defer to primary team for workup and management     robaxin 500 bid can be considered                    ___________________________  Will follow with you.  Thank you,  Ashia Contreras MD  Diplomate of the American Board of Neurology and Psychiatry.  Diplomate of the American Board of Vascular Neurology.   [facility] ([facility]), [facility]   Ph: [phone]    Differential diagnosis and plan of care discussed with patient after the evaluation.   Advanced care planning options discussed.   Pain assessed and judicious use of narcotics when appropriate was discussed.  Importance of Fall prevention discussed.  Counseling on Smoking and Alcohol cessation was offered when appropriate.  Counseling on Diet, exercise, and medication compliance was done.   83 minutes spent on the total encounter;  more than 50 % of the visit was spent on counseling  and or coordinating care by the attending physician.    Thank you for allowing me to participate in the care of this you patient. Please do not hesitate to call me if you have any questions.     This and subsequent notes  will  inherently be subject to errors including those of syntax and substitutions which may escape proofreading. In such instances original meaning may be extrapolated by contextual derivation.

## 2023-07-30 NOTE — DISCHARGE NOTE PROVIDER - NSDCCPCAREPLAN_GEN_ALL_CORE_FT
PRINCIPAL DISCHARGE DIAGNOSIS  Diagnosis: Back pain  Assessment and Plan of Treatment: CT L spine w/ no fx, mild L4-5 L foramen/L>R lat recess stenosis, mild L5-S1 bulge R>L lat recess stenosis.   No neurosurgery intervention. Continue with steroid taper.     PRINCIPAL DISCHARGE DIAGNOSIS  Diagnosis: Back pain  Assessment and Plan of Treatment: CT L spine w/ no fx, mild L4-5 L foramen/L>R lat recess stenosis, mild L5-S1 bulge R>L lat recess stenosis.   No neurosurgery intervention. Continue with steroid taper.  Robaxin as needed for pain.  Recommend getting a right thigh/groin ultrasound possible lymphadenopathy vs muscle spasm

## 2023-07-30 NOTE — DISCHARGE NOTE PROVIDER - PROVIDER TOKENS
PROVIDER:[TOKEN:[[id number]],FOLLOWUP:[1 week]] PROVIDER:[TOKEN:[[id number]],FOLLOWUP:[1 week]],PROVIDER:[TOKEN:[[id number]],FOLLOWUP:[2 weeks]]

## 2023-07-30 NOTE — PHYSICAL THERAPY INITIAL EVALUATION ADULT - PERTINENT HX OF CURRENT PROBLEM, REHAB EVAL
Patient is a 43-year-old male history of stroke as a child, with residual left-sided weakness, presenting with severe right lower leg pain. Patient states right leg pain started a couple days ago, started to get worse. He was in the shower he bent down to  this piece of soap and plan he had severe pain in the right leg. Now unable to ambulate without significant pain, can barely move his leg. At home patient tried a Percocet, also gabapentin, without relief. Denies fall, trauma, fevers, difficulty urinating, bowel incontinence. Hospital course: 7/29 CT Lumbar Spine: Evidence of L4-L5 broad-based left foraminal-lateral disc herniation superimposed upon a disc bulge, impinging upon the thecal sac, resulting   in bilateral lateral recess and left greater than right neural foramen stenosis with facet arthrosis. evidence of L5-S1 disc bulge, resulting in right greater than left lateral recess stenosis with facet arthrosis. Patient is a 43-year-old male history of stroke as a child, with residual left-sided weakness, presenting with severe right lower leg pain. Patient states right leg pain started a couple days ago, started to get worse. He was in the shower he bent down to  this piece of soap and plan he had severe pain in the right leg. Now unable to ambulate without significant pain, can barely move his leg. At home patient tried a Percocet, also gabapentin, without relief. Denies fall, trauma, fevers, difficulty urinating, bowel incontinence. Hospital course: 7/29 CT Lumbar Spine: Evidence of L4-L5 broad-based left foraminal-lateral disc herniation superimposed upon a disc bulge, impinging upon the thecal sac, resulting   in bilateral lateral recess and left greater than right neural foramen stenosis with facet arthrosis. evidence of L5-S1 disc bulge, resulting in right greater than left lateral recess stenosis with facet arthrosis. 7/29 VA Duplex RT extremity: No evidence of right lower extremity deep venous thrombosis.

## 2023-07-30 NOTE — PHYSICAL THERAPY INITIAL EVALUATION ADULT - GAIT DEVIATIONS NOTED, PT EVAL
decreased ragini/decreased velocity of limb motion/decreased step length/decreased weight-shifting ability

## 2023-07-30 NOTE — PHYSICAL THERAPY INITIAL EVALUATION ADULT - ADDITIONAL COMMENTS
Patient lives with his wife and minor children on the 2nd floor of 2-family house; 12 steps w/B/L hand rails to reach 2nd floor. Patient reports he is independent in ADLs & IADLs w/o use of AD; denies any hx of fall . Pt. reports if he is d/c home, his spouse will be able to take care of him for any ADLs, if required.

## 2023-07-30 NOTE — PROGRESS NOTE ADULT - SUBJECTIVE AND OBJECTIVE BOX
DATE OF SERVICE: 07-30-23 @ 08:33  CHIEF COMPLAINT:Patient is a 43y old  Male who presents with a chief complaint of   	        PAST MEDICAL & SURGICAL HISTORY:  Hyperlipidemia  diet controlled      CVA (cerebrovascular accident)      No significant past surgical history              REVIEW OF SYSTEMS:  CONSTITUTIONAL: No fever, weight loss, or fatigue  EYES: No eye pain, visual disturbances, or discharge  NECK: No pain or stiffness  RESPIRATORY: No cough, wheezing, chills or hemoptysis; No Shortness of Breath  CARDIOVASCULAR: No chest pain, palpitations, passing out,   GASTROINTESTINAL: No abdominal or epigastric pain. No nausea, vomiting, or hematemesis;  GENITOURINARY: No dysuria, frequency, hematuria, or incontinence  NEUROLOGICAL: No headaches,   pain much better  can get up and walk     Medications:  MEDICATIONS  (STANDING):  heparin   Injectable 5000 Unit(s) SubCutaneous every 12 hours  methylPREDNISolone   Oral   methylPREDNISolone 4 milliGRAM(s) Oral before breakfast  methylPREDNISolone 4 milliGRAM(s) Oral after lunch  methylPREDNISolone 4 milliGRAM(s) Oral after dinner  methylPREDNISolone 8 milliGRAM(s) Oral at bedtime  pantoprazole    Tablet 40 milliGRAM(s) Oral before breakfast    MEDICATIONS  (PRN):  oxycodone    5 mG/acetaminophen 325 mG 2 Tablet(s) Oral every 6 hours PRN Severe Pain (7 - 10)    	    PHYSICAL EXAM:  T(C): 36.8 (07-30-23 @ 04:46), Max: 36.8 (07-29-23 @ 17:43)  HR: 80 (07-30-23 @ 04:46) (53 - 80)  BP: 107/74 (07-30-23 @ 04:46) (107/74 - 115/75)  RR: 18 (07-30-23 @ 04:46) (16 - 18)  SpO2: 98% (07-30-23 @ 04:46) (97% - 98%)  Wt(kg): --  I&O's Summary      Appearance: Normal	  HEENT:   Normal oral mucosa, PERRL, EOMI	  Lymphatic: No lymphadenopathy  Cardiovascular: Normal S1 S2, No JVD,   Respiratory: Lungs clear to auscultation	  Psychiatry: A & O x 3, Mood & affect appropriate  Gastrointestinal:  Soft, Non-tender, + BS	    Neurologic: inc rom   rlext   dec tenderness      TELEMETRY: 	    ECG:  	  RADIOLOGY:  OTHER: 	  	  LABS:	 	    CARDIAC MARKERS:                                13.6   4.93  )-----------( 226      ( 29 Jul 2023 13:58 )             41.8     07-29    140  |  106  |  13  ----------------------------<  92  4.6   |  27  |  0.92    Ca    9.0      29 Jul 2023 13:58    TPro  6.5  /  Alb  4.1  /  TBili  0.4  /  DBili  x   /  AST  19  /  ALT  20  /  AlkPhos  47  07-29    proBNP:   Lipid Profile:   HgA1c:   TSH: Thyroid Stimulating Hormone, Serum: 1.55 uIU/mL (07-29 @ 13:58)

## 2023-07-30 NOTE — DISCHARGE NOTE PROVIDER - CARE PROVIDER_API CALL
Adiel Chu  Internal Medicine  [address] [place] [address]  Phone: [phone]  Fax: [phone]  Follow Up Time: 1 week   Adiel Chu  Internal Medicine  [address] [place] [address]  Phone: [phone]  Fax: [phone]  Follow Up Time: 1 week    Ashia Contreras  Neurology  [address] [place] [address]  Phone: [phone]  Fax: [phone]  Follow Up Time: 2 weeks

## 2023-07-30 NOTE — DISCHARGE NOTE PROVIDER - NSDCFUADDAPPT_GEN_ALL_CORE_FT
Recommend getting a right thigh/groin ultrasound.      APPTS ARE READY TO BE MADE: [x ] YES    Best Family or Patient Contact (if needed):    Additional Information about above appointments (if needed):    1: PCP  2: Neurology   3:     Other comments or requests:    Recommend getting a right thigh/groin ultrasound.      APPTS ARE READY TO BE MADE: [x ] YES    Best Family or Patient Contact (if needed):    Additional Information about above appointments (if needed):    1: PCP  2: Neurology   3:     Other comments or requests:   Patient was scheduled for an appointment on 08/23 11:00a at [address] [place] [address] with Dr. Chu. Patient/Caregiver was advised of appointment details.  Providers Ashia Contreras's office was contacted to secure an appointment, however the office will follow up with the patient/caregiver directly.

## 2023-07-30 NOTE — DISCHARGE NOTE PROVIDER - NSDCMRMEDTOKEN_GEN_ALL_CORE_FT
Medrol Dosepak 4 mg oral tablet: 4 milligram(s) orally once a day before breakfast 24mg once starting July 29th, stop after 1 dose.   4 mg before breakfast starting July 30th, stop after 5 doses  4 mg after lunch starting July 30th, stop after 3 doses  4 mg after dinner starting July 30th, stop after 2 doses  8 mg at bedtime starting July 30th, stop after 1 dose  4 mg at bedtime starting July 31st, stop after 3 doses.   Medrol Dosepak 4 mg oral tablet: 4 milligram(s) orally once a day before breakfast 24mg once starting July 29th, stop after 1 dose.   4 mg before breakfast starting July 30th, stop after 5 doses  4 mg after lunch starting July 30th, stop after 3 doses  4 mg after dinner starting July 30th, stop after 2 doses  8 mg at bedtime starting July 30th, stop after 1 dose  4 mg at bedtime starting July 31st, stop after 3 doses.  methocarbamol 500 mg oral tablet: 1 tab(s) orally 2 times a day as needed for  moderate pain MDD: 2 tabs  methocarbamol 500 mg oral tablet: 1 tab(s) orally 2 times a day as needed for  moderate pain  pantoprazole 40 mg oral delayed release tablet: 1 tab(s) orally once a day (before a meal)

## 2023-07-31 ENCOUNTER — TRANSCRIPTION ENCOUNTER (OUTPATIENT)
Age: 43
End: 2023-07-31

## 2023-07-31 VITALS
DIASTOLIC BLOOD PRESSURE: 76 MMHG | TEMPERATURE: 97 F | RESPIRATION RATE: 19 BRPM | HEART RATE: 59 BPM | OXYGEN SATURATION: 99 % | SYSTOLIC BLOOD PRESSURE: 118 MMHG

## 2023-07-31 LAB
APPEARANCE UR: CLEAR — SIGNIFICANT CHANGE UP
BACTERIA # UR AUTO: NEGATIVE — SIGNIFICANT CHANGE UP
BILIRUB UR-MCNC: NEGATIVE — SIGNIFICANT CHANGE UP
COLOR SPEC: SIGNIFICANT CHANGE UP
DIFF PNL FLD: NEGATIVE — SIGNIFICANT CHANGE UP
EPI CELLS # UR: 0 /HPF — SIGNIFICANT CHANGE UP
GLUCOSE UR QL: NEGATIVE — SIGNIFICANT CHANGE UP
HYALINE CASTS # UR AUTO: 0 /LPF — SIGNIFICANT CHANGE UP (ref 0–2)
KETONES UR-MCNC: NEGATIVE — SIGNIFICANT CHANGE UP
LEUKOCYTE ESTERASE UR-ACNC: NEGATIVE — SIGNIFICANT CHANGE UP
NITRITE UR-MCNC: NEGATIVE — SIGNIFICANT CHANGE UP
PH UR: 6.5 — SIGNIFICANT CHANGE UP (ref 5–8)
PROT UR-MCNC: NEGATIVE — SIGNIFICANT CHANGE UP
RBC CASTS # UR COMP ASSIST: 0 /HPF — SIGNIFICANT CHANGE UP (ref 0–4)
SP GR SPEC: 1.01 — LOW (ref 1.01–1.02)
UROBILINOGEN FLD QL: NEGATIVE — SIGNIFICANT CHANGE UP
WBC UR QL: 0 /HPF — SIGNIFICANT CHANGE UP (ref 0–5)

## 2023-07-31 PROCEDURE — 99285 EMERGENCY DEPT VISIT HI MDM: CPT

## 2023-07-31 PROCEDURE — 97161 PT EVAL LOW COMPLEX 20 MIN: CPT

## 2023-07-31 PROCEDURE — 85027 COMPLETE CBC AUTOMATED: CPT

## 2023-07-31 PROCEDURE — 93971 EXTREMITY STUDY: CPT

## 2023-07-31 PROCEDURE — 81001 URINALYSIS AUTO W/SCOPE: CPT

## 2023-07-31 PROCEDURE — 84443 ASSAY THYROID STIM HORMONE: CPT

## 2023-07-31 PROCEDURE — 99253 IP/OBS CNSLTJ NEW/EST LOW 45: CPT | Mod: GC

## 2023-07-31 PROCEDURE — 72131 CT LUMBAR SPINE W/O DYE: CPT | Mod: MA

## 2023-07-31 PROCEDURE — 72148 MRI LUMBAR SPINE W/O DYE: CPT

## 2023-07-31 PROCEDURE — 80053 COMPREHEN METABOLIC PANEL: CPT

## 2023-07-31 RX ORDER — METHOCARBAMOL 500 MG/1
1 TABLET, FILM COATED ORAL
Qty: 60 | Refills: 0
Start: 2023-07-31 | End: 2023-08-29

## 2023-07-31 RX ORDER — PANTOPRAZOLE SODIUM 20 MG/1
1 TABLET, DELAYED RELEASE ORAL
Qty: 30 | Refills: 0
Start: 2023-07-31 | End: 2023-08-29

## 2023-07-31 RX ADMIN — PANTOPRAZOLE SODIUM 40 MILLIGRAM(S): 20 TABLET, DELAYED RELEASE ORAL at 06:47

## 2023-07-31 RX ADMIN — HEPARIN SODIUM 5000 UNIT(S): 5000 INJECTION INTRAVENOUS; SUBCUTANEOUS at 10:21

## 2023-07-31 RX ADMIN — Medication 4 MILLIGRAM(S): at 13:38

## 2023-07-31 RX ADMIN — METHOCARBAMOL 500 MILLIGRAM(S): 500 TABLET, FILM COATED ORAL at 10:20

## 2023-07-31 RX ADMIN — Medication 4 MILLIGRAM(S): at 06:47

## 2023-07-31 NOTE — CONSULT NOTE ADULT - ASSESSMENT
43-year-old male history of stroke as a child, with residual left-sided weakness, presenting with severe right lower leg pain.    Patient states right leg pain started last Thursday while he was in the shower and felt a pop with severe pain after bending to  his bath soap. ID consulted    Afebrile  WBC WNL  Imaging -non infectious  Pt states he is feeling 90 percent better robert after the muscle relaxant  On tapering solumedrol  Currently off abx   43-year-old male history of stroke as a child, with residual left-sided weakness, presenting with severe right lower leg pain.    Patient states right leg pain started last Thursday while he was in the shower and felt a pop with severe pain after bending to  his bath soap. ID consulted    Afebrile  WBC WNL  Imaging -non infectious  Pt states he is feeling 90 percent better robert after the muscle relaxant  On tapering solumedrol  Currently off abx    Plan  Suspect muscle strain given the circumstances. Overall he appears much improved and able to walk without pain  -No infectious issues at present   -Continue steroid taper   -Continue muscle relaxant as needed  -Continue Physical therapy  -If any ID concerns arise please do not hesitate to call us.  Plan d/w Dr. Robles And primary team

## 2023-07-31 NOTE — PROGRESS NOTE ADULT - SUBJECTIVE AND OBJECTIVE BOX
DATE OF SERVICE: 07-31-23 @ 13:33  CHIEF COMPLAINT:Patient is a 43y old  Male who presents with a chief complaint of RLE Pain (30 Jul 2023 11:03)    	        PAST MEDICAL & SURGICAL HISTORY:  Hyperlipidemia  diet controlled      CVA (cerebrovascular accident)      No significant past surgical history              REVIEW OF SYSTEMS:  feels much better  NECK: No pain or stiffness  RESPIRATORY: No cough, wheezing, chills or hemoptysis; No Shortness of Breath  CARDIOVASCULAR: No chest pain, palpitations, passing out, dizziness,   GASTROINTESTINAL: No abdominal or epigastric pain. No nausea, vomiting,   GENITOURINARY: No dysuria, frequency, hematuria, or incontinence  NEUROLOGICAL: No headaches,       Medications:  MEDICATIONS  (STANDING):  heparin   Injectable 5000 Unit(s) SubCutaneous every 12 hours  methocarbamol 500 milliGRAM(s) Oral two times a day  methylPREDNISolone 4 milliGRAM(s) Oral before breakfast  methylPREDNISolone 4 milliGRAM(s) Oral after lunch  methylPREDNISolone 4 milliGRAM(s) Oral at bedtime  methylPREDNISolone   Oral   methylPREDNISolone 4 milliGRAM(s) Oral after dinner  pantoprazole    Tablet 40 milliGRAM(s) Oral before breakfast    MEDICATIONS  (PRN):  oxycodone    5 mG/acetaminophen 325 mG 2 Tablet(s) Oral every 6 hours PRN Severe Pain (7 - 10)    	    PHYSICAL EXAM:  T(C): 36.4 (07-31-23 @ 09:51), Max: 36.7 (07-30-23 @ 20:58)  HR: 60 (07-31-23 @ 09:51) (60 - 73)  BP: 110/71 (07-31-23 @ 09:51) (100/71 - 110/71)  RR: 18 (07-31-23 @ 09:51) (18 - 18)  SpO2: 99% (07-31-23 @ 09:51) (93% - 99%)  Wt(kg): --  I&O's Summary    30 Jul 2023 07:01  -  31 Jul 2023 07:00  --------------------------------------------------------  IN: 480 mL / OUT: 0 mL / NET: 480 mL    31 Jul 2023 07:01 - 31 Jul 2023 13:33  --------------------------------------------------------  IN: 480 mL / OUT: 400 mL / NET: 80 mL          HEENT:   Normal oral mucosa,   Cardiovascular: Normal S1 S2, No JVD,   Respiratory: Lungs clear to auscultation	  Psychiatry: A & O   Gastrointestinal:  Soft, Non-tender, + BS	  	  Neurologic: Non-focal  Extremities: Normal range of motion,    TELEMETRY: 	    ECG:  	  RADIOLOGY:  OTHER: 	  	  LABS:	 	    CARDIAC MARKERS:                                13.6   4.93  )-----------( 226 ( 29 Jul 2023 13:58 )             41.8     07-29    140  |  106  |  13  ----------------------------<  92  4.6   |  27  |  0.92    Ca    9.0      29 Jul 2023 13:58    TPro  6.5  /  Alb  4.1  /  TBili  0.4  /  DBili  x   /  AST  19  /  ALT  20  /  AlkPhos  47  07-29    proBNP:   Lipid Profile:   HgA1c:   TSH:

## 2023-07-31 NOTE — DISCHARGE NOTE NURSING/CASE MANAGEMENT/SOCIAL WORK - PATIENT PORTAL LINK FT
You can access the FollowMyHealth Patient Portal offered by [facility] by registering at the following website: [email or web address]. By joining [employer]’s FollowMyHealth portal, you will also be able to view your health information using other applications (apps) compatible with our system.

## 2023-07-31 NOTE — PROGRESS NOTE ADULT - ASSESSMENT
43M h/o cva as child w residual llext weakness   p/f severe RLE pain + inability to ambulate 2/2 pain.   Severe medial thigh pain better   CT L spine w/ no fx, mild L4-5 L foramen/L>R lat recess stenosis, mild L5-S1 bulge R>L lat recess stenosis. Exam: MEHTA 5/5. SILT b/l. No clonus.   - No acute nsgy intervention as per neurosx  started  medrol pack w/ improvement  mri l/s spine can be done as outpt if ok w/ neuro  ppi  analgesics  neg  r lext dopplers   pt    d/c planning  
43M h/o cva as child w residual llext weakness   p/f severe RLE pain + inability to ambulate 2/2 pain.   Severe medial thigh pain better   CT L spine w/ no fx, mild L4-5 L foramen/L>R lat recess stenosis, mild L5-S1 bulge R>L lat recess stenosis. Exam: MEHTA 5/5. SILT b/l. No clonus.   - No acute nsgy intervention as per neurosx  started  medrol pack w/ improvement  mri l/s spine  noted  ppi  analgesics  neg  r lext dopplers   pt  id eval noted   d/c planning

## 2023-07-31 NOTE — DISCHARGE NOTE NURSING/CASE MANAGEMENT/SOCIAL WORK - NSDCPEFALRISK_GEN_ALL_CORE
For information on Fall & Injury Prevention, visit: [email or web address] OR  [email or web address] OR  https://www.cdc.gov/steadi/patient.html

## 2023-07-31 NOTE — CONSULT NOTE ADULT - SUBJECTIVE AND OBJECTIVE BOX
CHIEF COMPLAINT: leg pain/swelling    HISTORY OF PRESENT ILLNESS:  Patient is a 43-year-old male history of stroke as a child, with residual left-sided weakness, presenting with severe right lower leg pain.    Patient states right leg pain started a couple days ago, started to get worse.   He was in the shower he bent down to  this piece of soap and plan he had severe pain in the right leg.    Now unable to ambulate without significant pain, can barely move his leg.    At home patient tried a Percocet, also gabapentin, without relief.     Denies fall, trauma, fevers, difficulty urinating, bowel incontinence.      Allergies    No Known Allergies    Intolerances    	    MEDICATIONS:  heparin   Injectable 5000 Unit(s) SubCutaneous every 12 hours        methocarbamol 500 milliGRAM(s) Oral two times a day  oxycodone    5 mG/acetaminophen 325 mG 2 Tablet(s) Oral every 6 hours PRN    pantoprazole    Tablet 40 milliGRAM(s) Oral before breakfast    methylPREDNISolone   Oral   methylPREDNISolone 4 milliGRAM(s) Oral before breakfast  methylPREDNISolone 4 milliGRAM(s) Oral after lunch  methylPREDNISolone 4 milliGRAM(s) Oral at bedtime  methylPREDNISolone 4 milliGRAM(s) Oral after dinner        PAST MEDICAL & SURGICAL HISTORY:  Hyperlipidemia  diet controlled      CVA (cerebrovascular accident)      No significant past surgical history          FAMILY HISTORY:  Family history of coronary artery disease in father (Father)   MI 57yo        SOCIAL HISTORY:    non smoker. indep in adl      REVIEW OF SYSTEMS:  See HPI, otherwise complete 10 point review of systems negative    [ ] All others negative	      PHYSICAL EXAM:  T(C): 36.6 (23 @ 04:10), Max: 36.7 (23 @ 12:16)  HR: 73 (23 @ 04:10) (66 - 73)  BP: 100/71 (23 @ 04:10) (100/71 - 110/69)  RR: 18 (23 @ 04:10) (18 - 18)  SpO2: 93% (23 @ 04:10) (93% - 98%)  Wt(kg): --  I&O's Summary    2023 07:01  -  2023 07:00  --------------------------------------------------------  IN: 480 mL / OUT: 0 mL / NET: 480 mL        Appearance: No Acute Distress	  HEENT:  Normal oral mucosa, PERRL, EOMI	  Cardiovascular: Normal S1 S2, No JVD, No murmurs/rubs/gallops  Respiratory: Lungs clear to auscultation bilaterally  Gastrointestinal:  Soft, Non-tender, + BS	  Skin: No rashes, No ecchymoses, No cyanosis	  Neurologic: Non-focal  Extremities: No clubbing, cyanosis or edema  Vascular: Peripheral pulses palpable 2+ bilaterally  Psychiatry: A & O x 3, Mood & affect appropriate    Laboratory Data:	 	    CBC Full  -  ( 2023 13:58 )  WBC Count : 4.93 K/uL  Hemoglobin : 13.6 g/dL  Hematocrit : 41.8 %  Platelet Count - Automated : 226 K/uL  Mean Cell Volume : 95.2 fl  Mean Cell Hemoglobin : 31.0 pg  Mean Cell Hemoglobin Concentration : 32.5 gm/dL  Auto Neutrophil # : x  Auto Lymphocyte # : x  Auto Monocyte # : x  Auto Eosinophil # : x  Auto Basophil # : x  Auto Neutrophil % : x  Auto Lymphocyte % : x  Auto Monocyte % : x  Auto Eosinophil % : x  Auto Basophil % : x    07-    140  |  106  |  13  ----------------------------<  92  4.6   |  27  |  0.92    Ca    9.0      2023 13:58    TPro  6.5  /  Alb  4.1  /  TBili  0.4  /  DBili  x   /  AST  19  /  ALT  20  /  AlkPhos  47  -      proBNP:   Lipid Profile:   HgA1c:   TSH:       CARDIAC MARKERS:            Interpretation of Telemetry: 	    ECG:  	  RADIOLOGY:  OTHER: 	    PREVIOUS DIAGNOSTIC TESTING:    [ ] Echocardiogram:  [ ] Catheterization:  [ ] Stress Test:  	    IMPRESSION:  1. L4-L5 broad-based left foraminal-lateral disc herniation superimposed   upon a disc bulge, resulting in mild left neural foramen stenosis.  2. L5-S1 disc bulge, resulting in right greater than left lateral recess   stenosis with facet arthrosis, abutting the right S1 nerve roots.  3. Additional findings described in detail above.      Assessment:  43-year-old male history of stroke as a child, with residual left-sided weakness, presenting with severe right lower leg pain. MRI suggestive of lumbar-sacral spine pathology    Recs:  cardiac stable  denies any ischemic or hf sx  le arturo duplex neg. if persistent pain and swelling consider additional imaging with CT/MRI and d-dimer level  pain control  f/u neuro recs  dvt ppx        Advanced care planning forms were discussed. Code status including forceful chest compressions, defibrillation and intubation were discussed. The risks benefits and alternatives to pertinent cardiac procedures and interventions were discussed in detail and all questions were answered. Duration: 15 minutes.  Greater than 90 minutes spent on total encounter; more than 50% of the visit was spent counseling and/or coordinating care by the attending physician.   	  Rich Lopez MD   Cardiovascular Diseases  [phone]

## 2023-07-31 NOTE — CONSULT NOTE ADULT - NS ATTEND AMEND GEN_ALL_CORE FT
43M with hx stroke as a child, with residual left-sided weakness, presenting 7/29 after developing severe right lower leg pain.  Right leg pain started a couple days ago, started to get worse.  He was in the shower he bent down to  this piece of soap and plan he had severe pain in the right leg.   Now unable to ambulate without significant pain, can barely move his leg.  At home patient tried a Percocet, also gabapentin, without relief.  Denies fall, trauma, fevers, difficulty urinating, bowel incontinence. (29 Jul 2023 13:08) 43M with hx stroke as a child, with residual left-sided weakness, presenting 7/29 after developing severe right lower leg pain.  Right leg pain started a couple days ago, started to get worse.  He was in the shower he bent down to  this piece of soap and plan he had severe pain in the right leg.   Now unable to ambulate without significant pain, can barely move his leg.  Better with steroids.  Probably strain or spasms    NO cellulitis  no antibiotics    Please call Infectious Diseases if there is a change in status.  Thank you.  [phone].

## 2023-07-31 NOTE — CONSULT NOTE ADULT - SUBJECTIVE AND OBJECTIVE BOX
Patient is a 43y old  Male who presents with a chief complaint of RLE Pain (2023 11:03)    HPI:  Patient is a 43-year-old male history of stroke as a child, with residual left-sided weakness, presenting with severe right lower leg pain.    Patient states right leg pain started a couple days ago, started to get worse.   He was in the shower he bent down to  this piece of soap and plan he had severe pain in the right leg.    Now unable to ambulate without significant pain, can barely move his leg.    At home patient tried a Percocet, also gabapentin, without relief.     Denies fall, trauma, fevers, difficulty urinating, bowel incontinence. (2023 13:08)       prior hospital charts reviewed [ x ]  primary team notes reviewed [x  ]  other consultant notes reviewed [x  ]    PAST MEDICAL & SURGICAL HISTORY:  Hyperlipidemia  diet controlled  CVA (cerebrovascular accident)    No significant past surgical history    Allergies  No Known Allergies    ANTIMICROBIALS (past 90 days)  MEDICATIONS  (STANDING):          MEDICATIONS  (STANDING):  heparin   Injectable 5000 every 12 hours  methocarbamol 500 two times a day  methylPREDNISolone    methylPREDNISolone 4 after dinner  methylPREDNISolone 4 before breakfast  methylPREDNISolone 4 after lunch  methylPREDNISolone 4 at bedtime  oxycodone    5 mG/acetaminophen 325 mG 2 every 6 hours PRN  pantoprazole    Tablet 40 before breakfast    SOCIAL HISTORY:       FAMILY HISTORY:  Family history of coronary artery disease in father (Father)   MI 57yo      REVIEW OF SYSTEMS  [  ] ROS unobtainable because:    [  ] All other systems negative except as noted below:	    Constitutional:  [ ] fever [ ] chills  [ ] weight loss  [ ] weakness  Skin:  [ ] rash [ ] phlebitis	  Eyes: [ ] icterus [ ] pain  [ ] discharge	  ENMT: [ ] sore throat  [ ] thrush [ ] ulcers [ ] exudates  Respiratory: [ ] dyspnea [ ] hemoptysis [ ] cough [ ] sputum	  Cardiovascular:  [ ] chest pain [ ] palpitations [ ] edema	  Gastrointestinal:  [ ] nausea [ ] vomiting [ ] diarrhea [ ] constipation [ ] pain	  Genitourinary:  [ ] dysuria [ ] frequency [ ] hematuria [ ] discharge [ ] flank pain  [ ] incontinence  Musculoskeletal:  [ ] myalgias [ ] arthralgias [ ] arthritis  [ ] back pain  Neurological:  [ ] headache [ ] seizures  [ ] confusion/altered mental status  Psychiatric:  [ ] anxiety [ ] depression	  Hematology/Lymphatics:  [ ] lymphadenopathy  Endocrine:  [ ] adrenal [ ] thyroid  Allergic/Immunologic:	 [ ] transplant [ ] seasonal    Vital Signs Last 24 Hrs  T(F): 97.9 (23 @ 04:10), Max: 98.2 (23 @ 17:43)  Vital Signs Last 24 Hrs  HR: 73 (23 @ 04:10) (66 - 73)  BP: 100/71 (23 @ 04:10) (100/71 - 110/69)  RR: 18 (23 @ 04:10)  SpO2: 93% (23 @ 04:10) (93% - 98%)  Wt(kg): --    PHYSICAL EXAM:  Constitutional: non-toxic, no distress  HEAD/EYES: anicteric, no conjunctival injection  ENT:  supple, no thrush  Cardiovascular:   normal S1, S2, no murmur, no edema  Respiratory:  clear BS bilaterally, no wheezes, no rales  GI:  soft, non-tender, normal bowel sounds  :  no salcedo, no CVA tenderness  Musculoskeletal:  no synovitis, normal ROM  Neurologic: awake and alert, normal strength, no focal findings  Skin:  no rash, no erythema, no phlebitis  Heme/Onc: no lymphadenopathy   Psychiatric:  awake, alert, appropriate mood                            13.6   4.93  )-----------( 226      ( 2023 13:58 )             41.8       140  |  106  |  13  ----------------------------<  92  4.6   |  27  |  0.92    Ca    9.0      2023 13:58    TPro  6.5  /  Alb  4.1  /  TBili  0.4  /  DBili  x   /  AST  19  /  ALT  20  /  AlkPhos  47      Urinalysis Basic - ( 2023 13:58 )    Color: x / Appearance: x / SG: x / pH: x  Gluc: 92 mg/dL / Ketone: x  / Bili: x / Urobili: x   Blood: x / Protein: x / Nitrite: x   Leuk Esterase: x / RBC: x / WBC x   Sq Epi: x / Non Sq Epi: x / Bacteria: x    MICROBIOLOGY:        RADIOLOGY:  imaging below reviewed and agree with findings    < from: [facility] Duplex Lower Ext Vein Scan, Right (23 @ 16:09) >  FINDINGS:    There is normal compressibility of the right common femoral, femoral and   popliteal veins.  The contralateral common femoral vein is patent.  Doppler examination shows normal spontaneous and phasic flow.    No calf vein thrombosis is detected.    IMPRESSION:  No evidence of right lower extremity deep venous thrombosis.    < end of copied text >   Patient is a 43y old  Male who presents with a chief complaint of RLE Pain (2023 11:03)    HPI:  Patient is a 43-year-old male history of stroke as a child, with residual left-sided weakness, presenting with severe right lower leg pain.    Patient states right leg pain started a couple days ago, started to get worse.   He was in the shower he bent down to  this piece of soap and plan he had severe pain in the right leg.    Now unable to ambulate without significant pain, can barely move his leg.    At home patient tried a Percocet, also gabapentin, without relief.     Denies fall, trauma, fevers, difficulty urinating, bowel incontinence. (2023 13:08)       prior hospital charts reviewed [ x ]  primary team notes reviewed [x  ]  other consultant notes reviewed [x  ]    PAST MEDICAL & SURGICAL HISTORY:  Hyperlipidemia  diet controlled  CVA (cerebrovascular accident) childhood    No significant past surgical history    Allergies  No Known Allergies    ANTIMICROBIALS (past 90 days)  MEDICATIONS  (STANDING):          MEDICATIONS  (STANDING):  heparin   Injectable 5000 every 12 hours  methocarbamol 500 two times a day  methylPREDNISolone    methylPREDNISolone 4 after dinner  methylPREDNISolone 4 before breakfast  methylPREDNISolone 4 after lunch  methylPREDNISolone 4 at bedtime  oxycodone    5 mG/acetaminophen 325 mG 2 every 6 hours PRN  pantoprazole    Tablet 40 before breakfast    SOCIAL HISTORY:   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 [employer]    FAMILY HISTORY:  Family history of coronary artery disease in father (Father)   MI 57yo      REVIEW OF SYSTEMS  [  ] ROS unobtainable because:    [x  ] All other systems negative except as noted below:	    Constitutional:  [ ] fever [ ] chills  [ ] weight loss  [ ] weakness  Skin:  [ ] rash [ ] phlebitis	  Eyes: [ ] icterus [ ] pain  [ ] discharge	  ENMT: [ ] sore throat  [ ] thrush [ ] ulcers [ ] exudates  Respiratory: [ ] dyspnea [ ] hemoptysis [ ] cough [ ] sputum	  Cardiovascular:  [ ] chest pain [ ] palpitations [ ] edema	  Gastrointestinal:  [ ] nausea [ ] vomiting [ ] diarrhea [ ] constipation [ ] pain	  Genitourinary:  [ ] dysuria [ ] frequency [ ] hematuria [ ] discharge [ ] flank pain  [ ] incontinence  Musculoskeletal:  [ ] myalgias [ ] arthralgias [ ] arthritis  [ ] back pain  Neurological:  [ ] headache [ ] seizures  [ ] confusion/altered mental status  Psychiatric:  [ ] anxiety [ ] depression	  Hematology/Lymphatics:  [ ] lymphadenopathy  Endocrine:  [ ] adrenal [ ] thyroid  Allergic/Immunologic:	 [ ] transplant [ ] seasonal    Vital Signs Last 24 Hrs  T(F): 97.9 (23 @ 04:10), Max: 98.2 (23 @ 17:43)  Vital Signs Last 24 Hrs  HR: 73 (23 @ 04:10) (66 - 73)  BP: 100/71 (23 @ 04:10) (100/71 - 110/69)  RR: 18 (23 @ 04:10)  SpO2: 93% (23 @ 04:10) (93% - 98%)  Wt(kg): --    PHYSICAL EXAM:  Constitutional: non-toxic, no distress  HEAD/EYES: anicteric, no conjunctival injection  ENT:  supple, no thrush  Cardiovascular:   normal S1, S2, no murmur, no edema  Respiratory:  clear BS bilaterally, no wheezes, no rales  GI:  soft, non-tender, normal bowel sounds  :  no salcedo, no CVA tenderness  Musculoskeletal:  no synovitis, normal ROM  Neurologic: awake and alert, 4/5 CORA+LL extrem weakness Normal strength to RU RLL extrem,   Skin:  no rash, no erythema, no phlebitis  Heme/Onc: no lymphadenopathy   Psychiatric:  awake, alert, appropriate mood                            13.6   4.93  )-----------( 226      ( 2023 13:58 )             41.8   -    140  |  106  |  13  ----------------------------<  92  4.6   |  27  |  0.92    Ca    9.0      2023 13:58    TPro  6.5  /  Alb  4.1  /  TBili  0.4  /  DBili  x   /  AST  19  /  ALT  20  /  AlkPhos  47      Urinalysis Basic - ( 2023 13:58 )    Color: x / Appearance: x / SG: x / pH: x  Gluc: 92 mg/dL / Ketone: x  / Bili: x / Urobili: x   Blood: x / Protein: x / Nitrite: x   Leuk Esterase: x / RBC: x / WBC x   Sq Epi: x / Non Sq Epi: x / Bacteria: x    MICROBIOLOGY:      RADIOLOGY:  imaging below reviewed and agree with findings    < from: [facility] Duplex Lower Ext Vein Scan, Right (23 @ 16:09) >  FINDINGS:    There is normal compressibility of the right common femoral, femoral and   popliteal veins.  The contralateral common femoral vein is patent.  Doppler examination shows normal spontaneous and phasic flow.    No calf vein thrombosis is detected.    IMPRESSION:  No evidence of right lower extremity deep venous thrombosis.    < end of copied text >   Patient is a 43y old  Male who presents with a chief complaint of RLE Pain (2023 11:03)    HPI:  43M with hx stroke as a child, with residual left-sided weakness, presenting  after developing severe right lower leg pain.  Right leg pain started a couple days ago, started to get worse.  He was in the shower he bent down to  this piece of soap and plan he had severe pain in the right leg.   Now unable to ambulate without significant pain, can barely move his leg.  At home patient tried a Percocet, also gabapentin, without relief.  Denies fall, trauma, fevers, difficulty urinating, bowel incontinence. (2023 13:08)     ID asked to help management.    prior hospital charts reviewed [ x ]  primary team notes reviewed [x  ]  other consultant notes reviewed [x  ]    PAST MEDICAL & SURGICAL HISTORY:  Hyperlipidemia  diet controlled  CVA (cerebrovascular accident) childhood    Allergies  No Known Allergies    ANTIMICROBIALS:  none    MEDICATIONS  (STANDING):  heparin   Injectable 5000 every 12 hours  methocarbamol 500 two times a day  methylPREDNISolone 4 after dinner  methylPREDNISolone 4 before breakfast  methylPREDNISolone 4 after lunch  methylPREDNISolone 4 at bedtime  pantoprazole    Tablet 40 before breakfast    SOCIAL HISTORY:   Works [employer]    FAMILY HISTORY:  Family history of coronary artery disease in father (Father)   MI 57yo    REVIEW OF SYSTEMS  [  ] ROS unobtainable because:    [x  ] All other systems negative except as noted below:	    Constitutional:  [ ] fever [ ] chills  [ ] weight loss  [ ] weakness  Skin:  [ ] rash [ ] phlebitis	  Eyes: [ ] icterus [ ] pain  [ ] discharge	  ENMT: [ ] sore throat  [ ] thrush [ ] ulcers [ ] exudates  Respiratory: [ ] dyspnea [ ] hemoptysis [ ] cough [ ] sputum	  Cardiovascular:  [ ] chest pain [ ] palpitations [ ] edema	  Gastrointestinal:  [ ] nausea [ ] vomiting [ ] diarrhea [ ] constipation [ ] pain	  Genitourinary:  [ ] dysuria [ ] frequency [ ] hematuria [ ] discharge [ ] flank pain  [ ] incontinence  Musculoskeletal:  [ ] myalgias [ ] arthralgias [ ] arthritis  [ ] back pain  Neurological:  [ ] headache [ ] seizures  [ ] confusion/altered mental status  Psychiatric:  [ ] anxiety [ ] depression	  Hematology/Lymphatics:  [ ] lymphadenopathy  Endocrine:  [ ] adrenal [ ] thyroid  Allergic/Immunologic:	 [ ] transplant [ ] seasonal    Vital Signs Last 24 Hrs  T(F): 97.9 (23 @ 04:10), Max: 98.2 (23 @ 17:43)  Vital Signs Last 24 Hrs  HR: 73 (23 @ 04:10) (66 - 73)  BP: 100/71 (23 @ 04:10) (100/71 - 110/69)  RR: 18 (23 @ 04:10)  SpO2: 93% (23 @ 04:10) (93% - 98%)  Wt(kg): --    PHYSICAL EXAM:  Constitutional: non-toxic, no distress  HEAD/EYES: anicteric, no conjunctival injection  ENT:  supple, no thrush  Cardiovascular:   normal S1, S2, no murmur, no edema  Respiratory:  clear BS bilaterally, no wheezes, no rales  GI:  soft, non-tender, normal bowel sounds  :  no salcedo, no CVA tenderness  Musculoskeletal:  no synovitis, normal ROM  Neurologic: awake and alert, 4/5 CORA+LL extrem weakness Normal strength to RU RLL extrem,   Skin:  no rash, no erythema, no phlebitis  Heme/Onc: no lymphadenopathy   Psychiatric:  awake, alert, appropriate mood                        13.6   4.93  )-----------( 226      ( 2023 13:58 )             41.8       140  |  106  |  13  ----------------------------<  92  4.6   |  27  |  0.92    Ca    9.0      2023 13:58    TPro  6.5  /  Alb  4.1  /  TBili  0.4  /  DBili  x   /  AST  19  /  ALT  20  /  AlkPhos  47      Urinalysis + Microscopic Examination (10..18 @ 04:12)   pH Urine: 7.0  Urine Appearance: Clear  Color: Colorless  Specific Gravity: 1.043  Protein, Urine: Negative  Glucose Qualitative, Urine: Negative  Ketone - Urine: Negative  Blood, Urine: Negative  Bilirubin: Negative  Urobilinogen: Negative  Leukocyte Esterase Concentration: Negative  Nitrite: Negative  White Blood Cell - Urine: 0 /hpf  Red Blood Cell - Urine: 0 /hpf  Bacteria: Negative  Hyaline Casts: 0 /lpf  Epithelial Cells: 0 /hpf  MICROBIOLOGY:    RADIOLOGY:  imaging below reviewed and agree with findings    MR Lumbar Spine No Cont (23 @ 18:22) >  IMPRESSION:  1. L4-L5 broad-based left foraminal-lateral discherniation superimposed upon a disc bulge, resulting in mild left neural foramen stenosis.  2. L5-S1 disc bulge, resulting in right greater than left lateral recess stenosis with facet arthrosis, abutting the right S1 nerve roots.  3. Additional findings described in detail above.    [facility] Duplex Lower Ext Vein Scan, Right (23 @ 16:09) >  IMPRESSION:  No evidence of right lower extremity deep venous thrombosis.    CT Lumbar Spine No Cont (23 @ 09:53) >  IMPRESSION:  1. No compression fracture, subluxation or spondylolysis defect.  2. Evidence of L4-L5 broad-based left foraminal-lateral disc herniation superimposed upon a disc bulge, impinging upon the thecalsac, resulting in bilateral lateral recess and left greater than right neural foramen stenosis with facet arthrosis.  3. Evidence of L5-S1 disc bulge, resulting in right greater than left lateral recess stenosis with facet arthrosis.  4. Additional findings above.  If there are no medical contraindications, further characterization of these findings with noncontrast MRI of the lumbar spine would be recommended.    VA Duplex Lower Ext Vein Scan, Right (23 @ 16:09) >  IMPRESSION:  No evidence of right lower extremity deep venous thrombosis.

## 2023-07-31 NOTE — DISCHARGE NOTE NURSING/CASE MANAGEMENT/SOCIAL WORK - NSDCFUADDAPPT_GEN_ALL_CORE_FT
Recommend getting a right thigh/groin ultrasound.      APPTS ARE READY TO BE MADE: [x ] YES    Best Family or Patient Contact (if needed):    Additional Information about above appointments (if needed):    1: PCP  2: Neurology   3:     Other comments or requests:

## 2023-07-31 NOTE — CHART NOTE - NSCHARTNOTEFT_GEN_A_CORE
Patient requested call back on 08/01.
Kevin Dos Santos  was  called  by  RN  that  pt  wants  to leave   against  medical  advice.   Pt  was   attempting  to leave  when  I  arrived  and   escorted  him  back  to  his  room  via  a  whee;  chair.    Pt  states  he  has   been in pain  for awhile   and  its  at     8/10   and  medication   is  not  working.    Pt  has  received  PO   Dilaudid  and  Ofirmev  as  prescribed  and  states  that  neither  has  relief  his  pain.    Dilaudid  1  mg  IVP  X1  dose  was  ordered  at  this  time,    Discussed  with  patient  the  plan  of  care   while he  is  in  the  hospital.    Pt  verbalized  understanding  and  states  he  is  not  leaving.

## 2024-04-12 NOTE — ED ADULT NURSE NOTE - CHPI ED NUR TIMING2
[FreeTextEntry1] : left shoulder, shoulder blade and neck pain since 7/8/23 after hoisting meaghan.   neck stiffness with left trap pain.  occ left arm radicular symptoms.    s/p right knee scope for pmm, plm, synovectomy, chondroplasty on 4/18/22.  oa present.  success with orthovisc in past.  csi on 3/22/24.   left knee pain with oa.  success with orthovisc in past.   csi on 3/22/24.  worsening

## 2025-08-14 ENCOUNTER — NON-APPOINTMENT (OUTPATIENT)
Age: 45
End: 2025-08-14

## 2025-08-14 ENCOUNTER — APPOINTMENT (OUTPATIENT)
Dept: NEUROSURGERY | Facility: CLINIC | Age: 45
End: 2025-08-14
Payer: COMMERCIAL

## 2025-08-14 VITALS
SYSTOLIC BLOOD PRESSURE: 124 MMHG | OXYGEN SATURATION: 100 % | BODY MASS INDEX: 22.22 KG/M2 | HEIGHT: 69 IN | WEIGHT: 150 LBS | TEMPERATURE: 97.3 F | DIASTOLIC BLOOD PRESSURE: 87 MMHG | HEART RATE: 66 BPM

## 2025-08-14 DIAGNOSIS — M54.12 RADICULOPATHY, CERVICAL REGION: ICD-10-CM

## 2025-08-14 PROCEDURE — 99203 OFFICE O/P NEW LOW 30 MIN: CPT

## 2025-08-18 PROBLEM — M54.12 RADICULOPATHY, CERVICAL REGION: Status: ACTIVE | Noted: 2025-08-18
